# Patient Record
Sex: MALE | Race: WHITE | NOT HISPANIC OR LATINO | Employment: OTHER | URBAN - METROPOLITAN AREA
[De-identification: names, ages, dates, MRNs, and addresses within clinical notes are randomized per-mention and may not be internally consistent; named-entity substitution may affect disease eponyms.]

---

## 2017-01-19 ENCOUNTER — OFFICE VISIT (OUTPATIENT)
Dept: PULMONOLOGY | Facility: CLINIC | Age: 82
End: 2017-01-19
Payer: MEDICARE

## 2017-01-19 ENCOUNTER — HOSPITAL ENCOUNTER (OUTPATIENT)
Dept: PULMONOLOGY | Facility: CLINIC | Age: 82
Discharge: HOME | End: 2017-01-19
Payer: MEDICARE

## 2017-01-19 VITALS
DIASTOLIC BLOOD PRESSURE: 74 MMHG | RESPIRATION RATE: 14 BRPM | SYSTOLIC BLOOD PRESSURE: 122 MMHG | WEIGHT: 198 LBS | BODY MASS INDEX: 28.35 KG/M2 | HEIGHT: 70 IN | HEART RATE: 73 BPM | OXYGEN SATURATION: 96 %

## 2017-01-19 DIAGNOSIS — J43.8 OTHER EMPHYSEMA: ICD-10-CM

## 2017-01-19 DIAGNOSIS — C67.9 MALIGNANT NEOPLASM OF URINARY BLADDER, UNSPECIFIED SITE: ICD-10-CM

## 2017-01-19 DIAGNOSIS — I25.810 CORONARY ARTERY DISEASE INVOLVING CORONARY BYPASS GRAFT OF NATIVE HEART WITHOUT ANGINA PECTORIS: ICD-10-CM

## 2017-01-19 DIAGNOSIS — J43.2 CENTRILOBULAR EMPHYSEMA: Primary | ICD-10-CM

## 2017-01-19 LAB
PRE FEV1 FVC: 65
PRE FEV1: 2.51
PRE FVC: 3.84
PREDICTED FEV1 FVC: 78
PREDICTED FEV1: 2.85
PREDICTED FVC: 3.64

## 2017-01-19 PROCEDURE — 99213 OFFICE O/P EST LOW 20 MIN: CPT | Mod: PBBFAC | Performed by: INTERNAL MEDICINE

## 2017-01-19 PROCEDURE — 99214 OFFICE O/P EST MOD 30 MIN: CPT | Mod: 25,S$PBB,, | Performed by: INTERNAL MEDICINE

## 2017-01-19 PROCEDURE — 99999 PR PBB SHADOW E&M-EST. PATIENT-LVL III: CPT | Mod: PBBFAC,,, | Performed by: INTERNAL MEDICINE

## 2017-01-19 PROCEDURE — 94010 BREATHING CAPACITY TEST: CPT | Mod: 26,S$PBB,, | Performed by: INTERNAL MEDICINE

## 2017-01-19 PROCEDURE — 94729 DIFFUSING CAPACITY: CPT | Mod: 26,S$PBB,, | Performed by: INTERNAL MEDICINE

## 2017-01-19 RX ORDER — LEVALBUTEROL TARTRATE 45 UG/1
2 AEROSOL, METERED ORAL EVERY 6 HOURS PRN
Qty: 1 INHALER | Refills: 4 | Status: SHIPPED | OUTPATIENT
Start: 2017-01-19 | End: 2018-08-09 | Stop reason: SDUPTHER

## 2017-01-19 NOTE — PROGRESS NOTES
Subjective:       Patient ID: Mayur Roblero is a 81 y.o. male.    Chief Complaint: COPD    HPI HISTORY OF PRESENT ILLNESS:  Mr. Roblero is a 81-year-old former smoker, who   returns for an interval assessment of chronic obstructive lung disease.  He   feels that he has had a stable respiratory status since his visit here in March of   last year.  He has continued to use Pulmicort on a twice daily schedule as   planned.  He also has Xopenex HFA inhaler, but has not needed to make recent use   of this.  He also has not had to take antibiotics or prednisone for any recent   exacerbations of respiratory symptoms.  He does continue to have dyspnea with   moderate exertion.  He has not had any exertional or pleuritic chest pain.    DATA:  I have reviewed the results from pulmonary function studies done earlier   today.  The forced vital capacity is 3.84 L, which is 106% of predicted.  The   FEV1 is 2.51 L, or 89% of predicted.  The ratio of these values is 65%.  The   diffusion capacity is 13.9, which is 60% of predicted.  When today's study is   compared to the previous study from September 2015, there does not appear to   have been any detrimental interval change.      CB/HN  dd: 01/19/2017 20:19:26 (CST)  td: 01/20/2017 07:29:43 (CST)  Doc ID   #[id number]  Job ID #[id number]    CC:       Review of Systems   Constitutional: Negative for fever and fatigue.   HENT: Negative for postnasal drip, sinus pressure, voice change and congestion.    Respiratory: Positive for cough and dyspnea on extertion. Negative for sputum production, shortness of breath and wheezing.    Cardiovascular: Negative for chest pain and leg swelling.   Genitourinary: Negative for difficulty urinating.   Musculoskeletal: Positive for arthralgias. Negative for back pain.   Skin: Negative for rash.   Gastrointestinal: Negative for abdominal pain and acid reflux.   Neurological: Negative for dizziness and weakness.   Hematological: Negative for adenopathy.        Objective:      Physical Exam   Constitutional: He is oriented to person, place, and time. He appears well-developed and well-nourished.   HENT:   Head: Normocephalic.   Mouth/Throat: Oropharynx is clear and moist. No oropharyngeal exudate.   Neck: Normal range of motion. Neck supple. No JVD present. No tracheal deviation present. No thyromegaly present.   Cardiovascular: Normal rate, regular rhythm and normal heart sounds.    No murmur heard.  Pulmonary/Chest: Symmetric chest wall expansion. No stridor. He has decreased breath sounds. He has no wheezes. He has no rhonchi. He has no rales. He exhibits no tenderness.   Abdominal: Soft.   Musculoskeletal: He exhibits no edema.   Lymphadenopathy:     He has no cervical adenopathy.   Neurological: He is alert and oriented to person, place, and time.   Skin: Skin is warm and dry. No rash noted. No erythema. Nails show no clubbing.   Psychiatric: He has a normal mood and affect.   Vitals reviewed.    Personal Diagnostic Review    No flowsheet data found.      Assessment:       1. Centrilobular emphysema    2. Malignant neoplasm of urinary bladder, unspecified site    3. Coronary artery disease involving coronary bypass graft of native heart without angina pectoris        Outpatient Encounter Prescriptions as of 1/19/2017   Medication Sig Dispense Refill    ALPHAGAN P 0.1 % Drop   0    amlodipine (NORVASC) 5 MG tablet TAKE 1 TABLET BY MOUTH EVERY DAY 30 tablet 6    aripiprazole (ABILIFY) 2 MG Tab TAKE 2 TABLETS BY MOUTH EVERY  tablet 2    aspirin (ECOTRIN) 81 MG EC tablet Take 81 mg by mouth once daily.        budesonide 180mcg (PULMICORT FLEXHALER) 180 mcg/actuation AePB Inhale 2 puffs into the lungs 2 (two) times daily. 1 each 5    CRESTOR 10 mg tablet TAKE 1 TABLET BY MOUTH EVERY DAY 30 tablet 6    CRESTOR 10 mg tablet TAKE 1 TABLET BY MOUTH EVERY DAY 30 tablet 6    doxycycline (VIBRA-TABS) 100 MG tablet Take 1 tablet (100 mg total) by mouth every 12  (twelve) hours. 20 tablet 1    levalbuterol (XOPENEX HFA) 45 mcg/actuation inhaler Inhale 2 puffs into the lungs every 6 (six) hours as needed for Wheezing or Shortness of Breath. 1 Inhaler 4    meclizine (ANTIVERT) 25 mg tablet Take 25 mg by mouth 3 (three) times daily as needed.      OMEGA-3S/DHA/EPA/FISH OIL (OMEGA 3 ORAL) Take by mouth once daily.      prednisoLONE acetate (PRED FORTE) 1 % DrpS   0    predniSONE (DELTASONE) 10 MG tablet Take 3 tabs per day x 5 days, then 2 tabs per day x 5 days, then 1 tab per day x 5 days, then stop. Take with food 30 tablet 1    rosuvastatin (CRESTOR) 10 MG tablet Take 1 tablet (10 mg total) by mouth once daily. 30 tablet 11    valacyclovir (VALTREX) 500 MG tablet Take 500 mg by mouth once daily.      vit C,E,Zn,Cu glu-om3-lut-clau (OCUVITE ADULT 50+) 250-5-1 mg Cap Take by mouth once daily.      [DISCONTINUED] levalbuterol (XOPENEX HFA) 45 mcg/actuation inhaler Inhale 2 puffs into the lungs every 6 (six) hours as needed for Wheezing or Shortness of Breath. 1 Inhaler 4    [DISCONTINUED] PULMICORT FLEXHALER 180 mcg/actuation AePB TAKE 2 PUFFS TWICE A DAY AS DIRECTED 1 each 5    [DISCONTINUED] PULMICORT FLEXHALER 180 mcg/actuation AePB TAKE 2 PUFFS TWICE A DAY AS DIRECTED 1 each 5     No facility-administered encounter medications on file as of 1/19/2017.      No orders of the defined types were placed in this encounter.    Plan:     Continue present respiratory medications (roles reviewed at today's visit).  Refills ordered for Pulmicort and Xopenex HFA.  Discussed the early role for antibiotics for treatment of respiratory infection.  Return visit 6 months with CXR before.

## 2017-01-20 NOTE — PATIENT INSTRUCTIONS
Continue present respiratory medications (roles reviewed at today's visit).  Refills ordered for Pulmicort and Xopenex HFA.  Discussed the early role for antibiotics for treatment of respiratory infection.  Return visit 6 months with CXR before.

## 2017-03-29 ENCOUNTER — PATIENT MESSAGE (OUTPATIENT)
Dept: RESEARCH | Facility: HOSPITAL | Age: 82
End: 2017-03-29

## 2017-04-13 RX ORDER — BUDESONIDE 180 UG/1
AEROSOL, POWDER RESPIRATORY (INHALATION)
Qty: 1 EACH | Refills: 12 | Status: SHIPPED | OUTPATIENT
Start: 2017-04-13 | End: 2018-04-19 | Stop reason: SDUPTHER

## 2017-06-30 ENCOUNTER — TELEPHONE (OUTPATIENT)
Dept: NEUROLOGY | Facility: CLINIC | Age: 82
End: 2017-06-30

## 2017-06-30 NOTE — TELEPHONE ENCOUNTER
----- Message from Leida Blount sent at 6/30/2017 11:16 AM CDT -----  Contact: Alissa wife [phone]  Wife is calling to schedule an EP appt for her , patient has shingles. Please call

## 2017-08-02 ENCOUNTER — OFFICE VISIT (OUTPATIENT)
Dept: NEUROLOGY | Facility: CLINIC | Age: 82
End: 2017-08-02
Payer: MEDICARE

## 2017-08-02 ENCOUNTER — HOSPITAL ENCOUNTER (OUTPATIENT)
Dept: RADIOLOGY | Facility: HOSPITAL | Age: 82
Discharge: HOME | End: 2017-08-02
Attending: INTERNAL MEDICINE
Payer: MEDICARE

## 2017-08-02 ENCOUNTER — OFFICE VISIT (OUTPATIENT)
Dept: PULMONOLOGY | Facility: CLINIC | Age: 82
End: 2017-08-02
Payer: MEDICARE

## 2017-08-02 ENCOUNTER — PATIENT MESSAGE (OUTPATIENT)
Dept: NEUROLOGY | Facility: CLINIC | Age: 82
End: 2017-08-02

## 2017-08-02 VITALS
WEIGHT: 195.13 LBS | BODY MASS INDEX: 27.94 KG/M2 | HEIGHT: 70 IN | SYSTOLIC BLOOD PRESSURE: 140 MMHG | HEART RATE: 69 BPM | DIASTOLIC BLOOD PRESSURE: 66 MMHG | OXYGEN SATURATION: 95 % | RESPIRATION RATE: 14 BRPM

## 2017-08-02 VITALS
WEIGHT: 195.56 LBS | BODY MASS INDEX: 28 KG/M2 | HEIGHT: 70 IN | SYSTOLIC BLOOD PRESSURE: 111 MMHG | DIASTOLIC BLOOD PRESSURE: 72 MMHG | HEART RATE: 90 BPM

## 2017-08-02 DIAGNOSIS — G25.69 TICS OF ORGANIC ORIGIN: ICD-10-CM

## 2017-08-02 DIAGNOSIS — I25.810 CORONARY ARTERY DISEASE INVOLVING CORONARY BYPASS GRAFT OF NATIVE HEART WITHOUT ANGINA PECTORIS: ICD-10-CM

## 2017-08-02 DIAGNOSIS — B02.8 HERPES ZOSTER WITH COMPLICATION: Primary | ICD-10-CM

## 2017-08-02 DIAGNOSIS — J43.2 CENTRILOBULAR EMPHYSEMA: ICD-10-CM

## 2017-08-02 DIAGNOSIS — B02.22 POST-HERPETIC TRIGEMINAL NEURALGIA: ICD-10-CM

## 2017-08-02 DIAGNOSIS — C67.9 MALIGNANT NEOPLASM OF URINARY BLADDER, UNSPECIFIED SITE: Primary | ICD-10-CM

## 2017-08-02 DIAGNOSIS — J84.10 POSTINFLAMMATORY PULMONARY FIBROSIS: ICD-10-CM

## 2017-08-02 PROCEDURE — 99213 OFFICE O/P EST LOW 20 MIN: CPT | Mod: PBBFAC,25,27 | Performed by: INTERNAL MEDICINE

## 2017-08-02 PROCEDURE — 99214 OFFICE O/P EST MOD 30 MIN: CPT | Mod: S$PBB,,, | Performed by: INTERNAL MEDICINE

## 2017-08-02 PROCEDURE — 1125F AMNT PAIN NOTED PAIN PRSNT: CPT | Mod: ,,, | Performed by: PSYCHIATRY & NEUROLOGY

## 2017-08-02 PROCEDURE — 99215 OFFICE O/P EST HI 40 MIN: CPT | Mod: S$PBB,,, | Performed by: PSYCHIATRY & NEUROLOGY

## 2017-08-02 PROCEDURE — 99999 PR PBB SHADOW E&M-EST. PATIENT-LVL III: CPT | Mod: PBBFAC,,, | Performed by: PSYCHIATRY & NEUROLOGY

## 2017-08-02 PROCEDURE — 71020 XR CHEST PA AND LATERAL: CPT | Mod: 26,,, | Performed by: RADIOLOGY

## 2017-08-02 PROCEDURE — 1159F MED LIST DOCD IN RCRD: CPT | Mod: ,,, | Performed by: INTERNAL MEDICINE

## 2017-08-02 PROCEDURE — 1159F MED LIST DOCD IN RCRD: CPT | Mod: ,,, | Performed by: PSYCHIATRY & NEUROLOGY

## 2017-08-02 PROCEDURE — 1126F AMNT PAIN NOTED NONE PRSNT: CPT | Mod: ,,, | Performed by: INTERNAL MEDICINE

## 2017-08-02 PROCEDURE — 99999 PR PBB SHADOW E&M-EST. PATIENT-LVL III: CPT | Mod: PBBFAC,,, | Performed by: INTERNAL MEDICINE

## 2017-08-02 RX ORDER — CLOPIDOGREL BISULFATE 75 MG/1
75 TABLET ORAL DAILY
Status: ON HOLD | COMMUNITY
End: 2019-02-15 | Stop reason: SDUPTHER

## 2017-08-02 RX ORDER — DULOXETIN HYDROCHLORIDE 30 MG/1
30 CAPSULE, DELAYED RELEASE ORAL DAILY
Qty: 30 CAPSULE | Refills: 11 | Status: ON HOLD | OUTPATIENT
Start: 2017-08-02 | End: 2019-05-18 | Stop reason: CLARIF

## 2017-08-02 NOTE — PROGRESS NOTES
Name: Mayur Roblero  MRN: [id number]   CSN: [id number]      Date: 08/02/2017    Chief Complaint:    - recent shingles of the L face, causing pain since K[place] 10.  Still itching.  Has cried from it.  - entire left face, treated with antivirals  - still with pain, treated with gabapentin 600 and made nervous and dizzy.  Has not tried anything else.  - tics and extra movements about the same, wife happy about that!    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  Tics are the greatest issue now.  Choreic movements are much less, but throat clearing tic is more dramatic.  Bothers his wife.  She wonders if it is a habit.    Sex drive and ED is new.  [employer] works.    ROS:  Review of Systems   Constitutional: Negative for malaise/fatigue and weight loss.   HENT: Negative for hearing loss.    Eyes: Negative for blurred vision and double vision.   Respiratory: Negative for shortness of breath and stridor.    Cardiovascular: Negative for chest pain and palpitations.   Gastrointestinal: Negative for constipation, nausea and vomiting.   Genitourinary: Negative for frequency and urgency.   Musculoskeletal: Negative for falls and myalgias.   Skin: Negative for rash.   Neurological: Negative for focal weakness and seizures.   Endo/Heme/Allergies: Does not bruise/bleed easily.   Psychiatric/Behavioral: Positive for memory loss (Seems to be pretty stable over a year). Negative for depression and hallucinations. The patient is nervous/anxious.        Past Medical History: The patient  has a past medical history of Bladder cancer; Coronary artery disease; Emphysema of lung; Hyperlipidemia; Pneumonia; and Pulmonary nodule.    Social History: The patient  reports that he quit smoking about 9 years ago. His smoking use included Cigarettes and Pipe. He has a 45.00 pack-year smoking history. He has never used smokeless tobacco. He reports that he does not drink alcohol or  use drugs.    Family History: Their family history includes Heart attack in his brother and mother; Heart disease in his brother and mother; Hypertension in his father.    Allergies: Avelox [moxifloxacin]; Dilaudid [hydromorphone]; Keflex [cephalexin]; Morphine; Spiriva with handihaler [tiotropium bromide]; Codeine; and Ondansetron     Meds:   Current Outpatient Prescriptions on File Prior to Visit   Medication Sig Dispense Refill    amlodipine (NORVASC) 5 MG tablet TAKE 1 TABLET BY MOUTH EVERY DAY 30 tablet 6    aripiprazole (ABILIFY) 2 MG Tab TAKE 2 TABLETS BY MOUTH EVERY  tablet 2    aspirin (ECOTRIN) 81 MG EC tablet Take 81 mg by mouth once daily.        budesonide 180mcg (PULMICORT FLEXHALER) 180 mcg/actuation AePB Inhale 2 puffs into the lungs 2 (two) times daily. 1 each 5    levalbuterol (XOPENEX HFA) 45 mcg/actuation inhaler Inhale 2 puffs into the lungs every 6 (six) hours as needed for Wheezing or Shortness of Breath. 1 Inhaler 4    OMEGA-3S/DHA/EPA/FISH OIL (OMEGA 3 ORAL) Take by mouth once daily.      prednisoLONE acetate (PRED FORTE) 1 % DrpS   0    PULMICORT FLEXHALER 180 mcg/actuation AePB USE 2 PUFFS 2 TIMES DAILY AS DIRECTED 1 each 12    rosuvastatin (CRESTOR) 10 MG tablet Take 1 tablet (10 mg total) by mouth once daily. 30 tablet 11    vit C,E,Zn,Cu glu-om3-lut-clau (OCUVITE ADULT 50+) 250-5-1 mg Cap Take by mouth once daily.      ALPHAGAN P 0.1 % Drop   0    CRESTOR 10 mg tablet TAKE 1 TABLET BY MOUTH EVERY DAY 30 tablet 6    CRESTOR 10 mg tablet TAKE 1 TABLET BY MOUTH EVERY DAY 30 tablet 6    doxycycline (VIBRA-TABS) 100 MG tablet Take 1 tablet (100 mg total) by mouth every 12 (twelve) hours. 20 tablet 1    meclizine (ANTIVERT) 25 mg tablet Take 25 mg by mouth 3 (three) times daily as needed.      predniSONE (DELTASONE) 10 MG tablet Take 3 tabs per day x 5 days, then 2 tabs per day x 5 days, then 1 tab per day x 5 days, then stop. Take with food 30 tablet 1    valacyclovir  "(VALTREX) 500 MG tablet Take 500 mg by mouth once daily.       No current facility-administered medications on file prior to visit.        Exam:  /72   Pulse 90   Ht 5' 10" (1.778 m)   Wt 88.7 kg (195 lb 8.8 oz)   BMI 28.06 kg/m²     Constitutional  Well-developed, well-nourished, appears stated age  Crusted lesions ov V1-V3 on the L.   * Specialized movement exam  MIld gravelly speech.   No facial masking.   Mild loss of wrinkling upper face VII.  Normal tone throughout.   No bradykinesia.   No tremor with rest, posture, kinesis, or intention.     No other dystonia, chorea, athetosis, myoclonus, or tics. No motor impersistence.   Gait is normal-based and steady with good stride length and normal arm swing. Able to tandem walk. No postural instability.      Laboratory/Radiological:  - Results:  No visits with results within 3 Month(s) from this visit.   Latest known visit with results is:   Admission on 04/21/2015, Discharged on 04/21/2015   Component Date Value Ref Range Status    WBC 04/21/2015 6.85  3.90 - 12.70 K/uL Final    RBC 04/21/2015 4.26* 4.60 - 6.20 M/uL Final    Hemoglobin 04/21/2015 14.2  14.0 - 18.0 g/dL Final    Hematocrit 04/21/2015 40.9  40.0 - 54.0 % Final    MCV 04/21/2015 96  82 - 98 fL Final    MCH 04/21/2015 33.3* 27.0 - 31.0 pg Final    MCHC 04/21/2015 34.7  32.0 - 36.0 % Final    RDW 04/21/2015 13.6  11.5 - 14.5 % Final    Platelets 04/21/2015 198  150 - 350 K/uL Final    MPV 04/21/2015 11.0  9.2 - 12.9 fL Final    Gran # 04/21/2015 4.6  1.8 - 7.7 K/uL Final    Lymph # 04/21/2015 1.1  1.0 - 4.8 K/uL Final    Mono # 04/21/2015 0.8  0.3 - 1.0 K/uL Final    Eos # 04/21/2015 0.3  0.0 - 0.5 K/uL Final    Baso # 04/21/2015 0.01  0.00 - 0.20 K/uL Final    Gran% 04/21/2015 67.5  38.0 - 73.0 % Final    Lymph% 04/21/2015 16.1* 18.0 - 48.0 % Final    Mono% 04/21/2015 12.0  4.0 - 15.0 % Final    Eosinophil% 04/21/2015 3.6  0.0 - 8.0 % Final    Basophil% 04/21/2015 0.1  0.0 " - 1.9 % Final    Differential Method 04/21/2015 Automated   Final    Sodium 04/21/2015 138  136 - 145 mmol/L Final    Potassium 04/21/2015 4.1  3.5 - 5.1 mmol/L Final    Chloride 04/21/2015 106  95 - 110 mmol/L Final    CO2 04/21/2015 22* 23 - 29 mmol/L Final    Glucose 04/21/2015 119* 70 - 110 mg/dL Final    BUN, Bld 04/21/2015 21  8 - 23 mg/dL Final    Creatinine 04/21/2015 1.2  0.5 - 1.4 mg/dL Final    Calcium 04/21/2015 9.8  8.7 - 10.5 mg/dL Final    Total Protein 04/21/2015 7.4  6.0 - 8.4 g/dL Final    Albumin 04/21/2015 3.6  3.5 - 5.2 g/dL Final    Total Bilirubin 04/21/2015 0.7  0.1 - 1.0 mg/dL Final    Alkaline Phosphatase 04/21/2015 91  55 - 135 U/L Final    AST 04/21/2015 32  10 - 40 U/L Final    ALT 04/21/2015 28  10 - 44 U/L Final    Anion Gap 04/21/2015 10  8 - 16 mmol/L Final    eGFR if African American 04/21/2015 >60.0  >60 mL/min/1.73 m^2 Final    eGFR if non African American 04/21/2015 57.6* >60 mL/min/1.73 m^2 Final    TSH 04/21/2015 1.496  0.400 - 4.000 uIU/mL Final    Specimen UA 04/21/2015 Urine, Clean Catch   Final    Color, UA 04/21/2015 Straw  Yellow, Straw, Suzette Final    Appearance, UA 04/21/2015 Clear  Clear Final    pH, UA 04/21/2015 7.0  5.0 - 8.0 Final    Specific Gravity, UA 04/21/2015 1.000* 1.005 - 1.030 Final    Protein, UA 04/21/2015 Negative  Negative Final    Glucose, UA 04/21/2015 Negative  Negative Final    Ketones, UA 04/21/2015 Negative  Negative Final    Bilirubin (UA) 04/21/2015 Negative  Negative Final    Occult Blood UA 04/21/2015 Negative  Negative Final    Nitrite, UA 04/21/2015 Negative  Negative Final    Urobilinogen, UA 04/21/2015 Negative  <2.0 EU/dL Final    Leukocytes, UA 04/21/2015 Negative  Negative Final    Magnesium 04/21/2015 2.2  1.6 - 2.6 mg/dL Final    Phosphorus 04/21/2015 2.7  2.7 - 4.5 mg/dL Final     - Independent review of images:     Diagnoses:          1) Chorea and tics, secondary to anoxic injury.  LOOKS GREAT.    2) Zoster, L face.  Now with post-herpetic neuroalgia.      Medical Decision Makin) Continue Abilify as 1-2 tabs at night  2) Did not tolerate high dose Gabapentin.  Could go lower.  Will trial Cymbalta 30 mg daily now.      Lobito Horowitz MD, MPH  Division of Movement and Memory Disorders  [facility]  [phone]

## 2017-08-02 NOTE — PROGRESS NOTES
Subjective:       Patient ID: Mayur Roblero is a 82 y.o. male.    Chief Complaint: COPD    HPI Mr. Roblero is an 82-year-old former smoker who comes for an interval assessment   of chronic obstructive lung disease.  His most recent previous visit here was in   January.  Since that time, he has had an additional coronary artery stent   placement by his cardiologist at home.  Then, approximately six weeks ago, he   developed shingles, which involved left side of his face.  He has had a very   slow recovery from this event.    Mr. Roblero has not had any recent exacerbations of respiratory symptoms.  He   continues to use Pulmicort on a regular schedule.  He has Xopenex, but has not   felt the need to use this in recent months.  He has not had to take antibiotics   for respiratory symptoms during these past six months or longer.    Mr. Roblero notes some cough in the evening with the feeling of throat congestion.    He is usually not able to expectorate any sputum.  He has not taken any   expectorants.    Mr. Roblero has the history of a previous bladder cancer.  He has not had any   recurrence of this malignancy during the past several years.    DATA:  I reviewed the images from a chest x-ray done earlier today.  The cardiac   silhouette is not enlarged.  There are mild linear streaks in the upper lung   zone on the right.  This is consistent with localized scarring related to his   previous pneumonia.  Findings in this area appear stable when today's radiograph   is compared to previous films from March of last year and February 2015.      CB/HN  dd: 08/02/2017 20:47:18 (CDT)  td: 08/03/2017 04:40:14 (CDT)  Doc ID   #[id number]  Job ID #[id number]    CC:       Review of Systems   Constitutional: Negative for fever and fatigue.   HENT: Negative for postnasal drip, sinus pressure, voice change and congestion.    Respiratory: Positive for dyspnea on extertion. Negative for cough, sputum production, shortness of breath and  wheezing.    Cardiovascular: Negative for chest pain and leg swelling.   Genitourinary: Negative for difficulty urinating.   Musculoskeletal: Negative for arthralgias and back pain.   Skin: Positive for rash (recent Shingles L side face).   Gastrointestinal: Negative for abdominal pain and acid reflux.   Neurological: Negative for dizziness and weakness.   Hematological: Negative for adenopathy.       Objective:      Physical Exam   Constitutional: He is oriented to person, place, and time. He appears well-developed and well-nourished.   HENT:   Head: Normocephalic.   Mouth/Throat: Oropharynx is clear and moist. No oropharyngeal exudate.   Neck: Normal range of motion. Neck supple. No JVD present. No tracheal deviation present. No thyromegaly present.   Cardiovascular: Normal rate, regular rhythm and normal heart sounds.    No murmur heard.  Pulmonary/Chest: Symmetric chest wall expansion. No stridor. He has decreased breath sounds. He has no wheezes. He has no rhonchi. He has no rales. He exhibits no tenderness.   Abdominal: Soft.   Musculoskeletal: He exhibits no edema.   Lymphadenopathy:     He has no cervical adenopathy.   Neurological: He is alert and oriented to person, place, and time.   Skin: Skin is warm and dry. No rash noted. No erythema. Nails show no clubbing.   Psychiatric: He has a normal mood and affect.   Vitals reviewed.    Personal Diagnostic Review    No flowsheet data found.      Assessment:       1. Malignant neoplasm of urinary bladder, unspecified site    2. Coronary artery disease involving coronary bypass graft of native heart without angina pectoris    3. Centrilobular emphysema    4. Postinflammatory pulmonary fibrosis        Outpatient Encounter Prescriptions as of 8/2/2017   Medication Sig Dispense Refill    ALPHAGAN P 0.1 % Drop   0    amlodipine (NORVASC) 5 MG tablet TAKE 1 TABLET BY MOUTH EVERY DAY 30 tablet 6    aripiprazole (ABILIFY) 2 MG Tab TAKE 2 TABLETS BY MOUTH EVERY DAY  180 tablet 2    aspirin (ECOTRIN) 81 MG EC tablet Take 81 mg by mouth once daily.        budesonide 180mcg (PULMICORT FLEXHALER) 180 mcg/actuation AePB Inhale 2 puffs into the lungs 2 (two) times daily. 1 each 5    clopidogrel (PLAVIX) 75 mg tablet Take 75 mg by mouth once daily.      CRESTOR 10 mg tablet TAKE 1 TABLET BY MOUTH EVERY DAY 30 tablet 6    CRESTOR 10 mg tablet TAKE 1 TABLET BY MOUTH EVERY DAY 30 tablet 6    doxycycline (VIBRA-TABS) 100 MG tablet Take 1 tablet (100 mg total) by mouth every 12 (twelve) hours. 20 tablet 1    duloxetine (CYMBALTA) 30 MG capsule Take 1 capsule (30 mg total) by mouth once daily. 30 capsule 11    levalbuterol (XOPENEX HFA) 45 mcg/actuation inhaler Inhale 2 puffs into the lungs every 6 (six) hours as needed for Wheezing or Shortness of Breath. 1 Inhaler 4    meclizine (ANTIVERT) 25 mg tablet Take 25 mg by mouth 3 (three) times daily as needed.      OMEGA-3S/DHA/EPA/FISH OIL (OMEGA 3 ORAL) Take by mouth once daily.      prednisoLONE acetate (PRED FORTE) 1 % DrpS   0    predniSONE (DELTASONE) 10 MG tablet Take 3 tabs per day x 5 days, then 2 tabs per day x 5 days, then 1 tab per day x 5 days, then stop. Take with food 30 tablet 1    PULMICORT FLEXHALER 180 mcg/actuation AePB USE 2 PUFFS 2 TIMES DAILY AS DIRECTED 1 each 12    rosuvastatin (CRESTOR) 10 MG tablet Take 1 tablet (10 mg total) by mouth once daily. 30 tablet 11    valacyclovir (VALTREX) 500 MG tablet Take 500 mg by mouth once daily.      vit C,E,Zn,Cu glu-om3-lut-clau (OCUVITE ADULT 50+) 250-5-1 mg Cap Take by mouth once daily.       No facility-administered encounter medications on file as of 8/2/2017.      Orders Placed This Encounter   Procedures    Spirometry without Bronchodilator     Standing Status:   Future     Standing Expiration Date:   4/3/2018    DLCO-Carbon Monoxide Diffusing Capacity     Standing Status:   Future     Standing Expiration Date:   4/3/2018     Plan:     Continue present  respiratory medications (roles reviewed at today's visit).  Discussed the early role for antibiotics for treatment of respiratory infection.  Return visit 6 months with Spirometry and DLCO.

## 2017-08-02 NOTE — PATIENT INSTRUCTIONS
Continue present respiratory medications (roles reviewed at today's visit).  Discussed the early role for antibiotics for treatment of respiratory infection.  Return visit 6 months with Spirometry and DLCO.

## 2017-08-03 RX ORDER — ARIPIPRAZOLE 2 MG/1
4 TABLET ORAL DAILY
Qty: 180 TABLET | Refills: 3 | Status: SHIPPED | OUTPATIENT
Start: 2017-08-03 | End: 2018-10-26 | Stop reason: SDUPTHER

## 2017-08-14 ENCOUNTER — PATIENT MESSAGE (OUTPATIENT)
Dept: NEUROLOGY | Facility: CLINIC | Age: 82
End: 2017-08-14

## 2018-03-21 ENCOUNTER — HOSPITAL ENCOUNTER (OUTPATIENT)
Dept: PULMONOLOGY | Facility: CLINIC | Age: 83
Discharge: HOME | End: 2018-03-21
Payer: MEDICARE

## 2018-03-21 ENCOUNTER — OFFICE VISIT (OUTPATIENT)
Dept: PULMONOLOGY | Facility: CLINIC | Age: 83
End: 2018-03-21
Payer: MEDICARE

## 2018-03-21 VITALS
RESPIRATION RATE: 14 BRPM | HEART RATE: 69 BPM | WEIGHT: 198 LBS | HEIGHT: 69 IN | DIASTOLIC BLOOD PRESSURE: 78 MMHG | BODY MASS INDEX: 29.33 KG/M2 | OXYGEN SATURATION: 96 % | SYSTOLIC BLOOD PRESSURE: 118 MMHG

## 2018-03-21 DIAGNOSIS — C67.9 MALIGNANT NEOPLASM OF URINARY BLADDER, UNSPECIFIED SITE: ICD-10-CM

## 2018-03-21 DIAGNOSIS — J43.2 CENTRILOBULAR EMPHYSEMA: ICD-10-CM

## 2018-03-21 DIAGNOSIS — J43.2 CENTRILOBULAR EMPHYSEMA: Primary | ICD-10-CM

## 2018-03-21 DIAGNOSIS — I25.810 CORONARY ARTERY DISEASE INVOLVING CORONARY BYPASS GRAFT OF NATIVE HEART WITHOUT ANGINA PECTORIS: ICD-10-CM

## 2018-03-21 DIAGNOSIS — J84.10 POSTINFLAMMATORY PULMONARY FIBROSIS: ICD-10-CM

## 2018-03-21 DIAGNOSIS — R06.02 SHORTNESS OF BREATH: ICD-10-CM

## 2018-03-21 LAB
PRE FEV1 FVC: 67
PRE FEV1: 2.55
PRE FVC: 3.8
PREDICTED FEV1 FVC: 77
PREDICTED FEV1: 2.79
PREDICTED FVC: 3.58

## 2018-03-21 PROCEDURE — 99214 OFFICE O/P EST MOD 30 MIN: CPT | Mod: 25,S$PBB,, | Performed by: INTERNAL MEDICINE

## 2018-03-21 PROCEDURE — 99999 PR PBB SHADOW E&M-EST. PATIENT-LVL III: CPT | Mod: PBBFAC,,, | Performed by: INTERNAL MEDICINE

## 2018-03-21 PROCEDURE — 94010 BREATHING CAPACITY TEST: CPT | Mod: PBBFAC | Performed by: INTERNAL MEDICINE

## 2018-03-21 PROCEDURE — 99213 OFFICE O/P EST LOW 20 MIN: CPT | Mod: PBBFAC | Performed by: INTERNAL MEDICINE

## 2018-03-21 PROCEDURE — 94010 BREATHING CAPACITY TEST: CPT | Mod: 26,S$PBB,, | Performed by: INTERNAL MEDICINE

## 2018-03-21 NOTE — PATIENT INSTRUCTIONS
Continue present respiratory medications (roles reviewed at today's visit).  Discussed the early role for antibiotics for treatment of respiratory infection.  Return visit 6 months with CXR before.

## 2018-03-21 NOTE — PROGRESS NOTES
Subjective:       Patient ID: Mayur Roblero is a 83 y.o. male.    Chief Complaint: COPD    HPI HISTORY OF PRESENT ILLNESS:  Mr. Roblero is an 83-year-old former smoker, who   returns for an interval assessment of chronic obstructive lung disease.  His   most recent previous visit here was in August of last year.  He has not had any   recent exacerbations of respiratory symptoms.  He remains fairly active.  He   does describe shortness of breath with moderate exertion.  He has not had any   ongoing nighttime respiratory problems.    Mr. Roblero continues to use Pulmicort Flexhaler.  He also has a Xopenex inhaler,   but makes very frequent use of this.  He has not had to take antibiotics or   oral corticosteroids for respiratory symptoms during the past year.    Mr. Roblero is monitored by physician near his home for treatment of coronary   artery disease and previous bladder cancer.    DATA:  I reviewed the results of a spirometry study done earlier today.  The   forced vital capacity is 3.80 L, which is 106% of the expected value.  The FEV1   is 2.25 L or 93% of predicted.  The ratio of these values is 67%.  When today's   spirometry results are compared to previous studies from January of last year   and September 2015, there does not appear to have been any detrimental change.      CB/HN  dd: 03/21/2018 19:53:49 (CDT)  td: 03/22/2018 15:40:59 (CDT)  Doc ID   #[id number]  Job ID #[id number]    CC:       Review of Systems   Constitutional: Negative for fever and fatigue.   HENT: Negative for postnasal drip, sinus pressure, voice change and congestion.    Respiratory: Positive for dyspnea on extertion. Negative for cough, sputum production, shortness of breath and wheezing.    Cardiovascular: Negative for chest pain and leg swelling.   Genitourinary: Negative for difficulty urinating.   Musculoskeletal: Positive for arthralgias. Negative for back pain.   Skin: Negative for rash.   Gastrointestinal: Negative for abdominal pain  and acid reflux.   Neurological: Negative for dizziness and weakness.   Hematological: Negative for adenopathy.       Objective:      Physical Exam   Constitutional: He is oriented to person, place, and time. He appears well-developed and well-nourished.   HENT:   Head: Normocephalic.   Mouth/Throat: Oropharynx is clear and moist. No oropharyngeal exudate.   Neck: Normal range of motion. Neck supple. No JVD present. No tracheal deviation present. No thyromegaly present.   Cardiovascular: Normal rate, regular rhythm and normal heart sounds.    No murmur heard.  Pulmonary/Chest: Hyperinflation. No stridor. He has no wheezes. He has no rhonchi. He has no rales. He exhibits no tenderness.   Abdominal: Soft.   Musculoskeletal: He exhibits no edema.   Lymphadenopathy:     He has no cervical adenopathy.   Neurological: He is alert and oriented to person, place, and time.   Skin: Skin is warm and dry. No rash noted. No erythema. Nails show no clubbing.   Psychiatric: He has a normal mood and affect.   Vitals reviewed.    Personal Diagnostic Review    No flowsheet data found.      Assessment:       1. Centrilobular emphysema    2. Postinflammatory pulmonary fibrosis    3. Coronary artery disease involving coronary bypass graft of native heart without angina pectoris    4. Malignant neoplasm of urinary bladder, unspecified site    5. Shortness of breath        Outpatient Encounter Prescriptions as of 3/21/2018   Medication Sig Dispense Refill    ALPHAGAN P 0.1 % Drop   0    amlodipine (NORVASC) 5 MG tablet TAKE 1 TABLET BY MOUTH EVERY DAY 30 tablet 6    aripiprazole (ABILIFY) 2 MG Tab Take 2 tablets (4 mg total) by mouth once daily. 180 tablet 3    aspirin (ECOTRIN) 81 MG EC tablet Take 81 mg by mouth once daily.        budesonide 180mcg (PULMICORT FLEXHALER) 180 mcg/actuation AePB Inhale 2 puffs into the lungs 2 (two) times daily. 1 each 5    clopidogrel (PLAVIX) 75 mg tablet Take 75 mg by mouth once daily.       CRESTOR 10 mg tablet TAKE 1 TABLET BY MOUTH EVERY DAY 30 tablet 6    CRESTOR 10 mg tablet TAKE 1 TABLET BY MOUTH EVERY DAY 30 tablet 6    doxycycline (VIBRA-TABS) 100 MG tablet Take 1 tablet (100 mg total) by mouth every 12 (twelve) hours. 20 tablet 1    duloxetine (CYMBALTA) 30 MG capsule Take 1 capsule (30 mg total) by mouth once daily. 30 capsule 11    levalbuterol (XOPENEX HFA) 45 mcg/actuation inhaler Inhale 2 puffs into the lungs every 6 (six) hours as needed for Wheezing or Shortness of Breath. 1 Inhaler 4    meclizine (ANTIVERT) 25 mg tablet Take 25 mg by mouth 3 (three) times daily as needed.      OMEGA-3S/DHA/EPA/FISH OIL (OMEGA 3 ORAL) Take by mouth once daily.      prednisoLONE acetate (PRED FORTE) 1 % DrpS   0    predniSONE (DELTASONE) 10 MG tablet Take 3 tabs per day x 5 days, then 2 tabs per day x 5 days, then 1 tab per day x 5 days, then stop. Take with food 30 tablet 1    PULMICORT FLEXHALER 180 mcg/actuation AePB USE 2 PUFFS 2 TIMES DAILY AS DIRECTED 1 each 12    rosuvastatin (CRESTOR) 10 MG tablet Take 1 tablet (10 mg total) by mouth once daily. 30 tablet 11    valacyclovir (VALTREX) 500 MG tablet Take 500 mg by mouth once daily.      vit C,E,Zn,Cu glu-om3-lut-clau (OCUVITE ADULT 50+) 250-5-1 mg Cap Take by mouth once daily.       No facility-administered encounter medications on file as of 3/21/2018.      Orders Placed This Encounter   Procedures    X-Ray Chest PA And Lateral     Standing Status:   Future     Standing Expiration Date:   3/21/2019     Plan:     Continue present respiratory medications (roles reviewed at today's visit).  Discussed the early role for antibiotics for treatment of respiratory infection.  Return visit 6 months with CXR before.

## 2018-04-16 RX ORDER — BUDESONIDE 180 UG/1
AEROSOL, POWDER RESPIRATORY (INHALATION)
Refills: 10 | OUTPATIENT
Start: 2018-04-16

## 2018-04-19 RX ORDER — BUDESONIDE 180 UG/1
AEROSOL, POWDER RESPIRATORY (INHALATION)
Qty: 1 EACH | Refills: 10 | Status: SHIPPED | OUTPATIENT
Start: 2018-04-19 | End: 2019-05-24

## 2018-07-02 ENCOUNTER — PATIENT MESSAGE (OUTPATIENT)
Dept: PULMONOLOGY | Facility: CLINIC | Age: 83
End: 2018-07-02

## 2018-08-09 ENCOUNTER — OFFICE VISIT (OUTPATIENT)
Dept: PULMONOLOGY | Facility: CLINIC | Age: 83
End: 2018-08-09
Payer: MEDICARE

## 2018-08-09 ENCOUNTER — HOSPITAL ENCOUNTER (OUTPATIENT)
Dept: RADIOLOGY | Facility: HOSPITAL | Age: 83
Discharge: HOME | End: 2018-08-09
Attending: INTERNAL MEDICINE
Payer: MEDICARE

## 2018-08-09 VITALS
WEIGHT: 196.44 LBS | HEART RATE: 88 BPM | BODY MASS INDEX: 29.09 KG/M2 | DIASTOLIC BLOOD PRESSURE: 76 MMHG | OXYGEN SATURATION: 96 % | HEIGHT: 69 IN | RESPIRATION RATE: 14 BRPM | SYSTOLIC BLOOD PRESSURE: 136 MMHG

## 2018-08-09 DIAGNOSIS — J84.10 POSTINFLAMMATORY PULMONARY FIBROSIS: ICD-10-CM

## 2018-08-09 DIAGNOSIS — J43.2 CENTRILOBULAR EMPHYSEMA: Primary | ICD-10-CM

## 2018-08-09 DIAGNOSIS — J43.2 CENTRILOBULAR EMPHYSEMA: ICD-10-CM

## 2018-08-09 DIAGNOSIS — I25.810 CORONARY ARTERY DISEASE INVOLVING CORONARY BYPASS GRAFT OF NATIVE HEART WITHOUT ANGINA PECTORIS: ICD-10-CM

## 2018-08-09 DIAGNOSIS — C67.9 MALIGNANT NEOPLASM OF URINARY BLADDER, UNSPECIFIED SITE: ICD-10-CM

## 2018-08-09 PROCEDURE — 99999 PR PBB SHADOW E&M-EST. PATIENT-LVL III: CPT | Mod: PBBFAC,,, | Performed by: INTERNAL MEDICINE

## 2018-08-09 PROCEDURE — 99213 OFFICE O/P EST LOW 20 MIN: CPT | Mod: PBBFAC,25 | Performed by: INTERNAL MEDICINE

## 2018-08-09 PROCEDURE — 99214 OFFICE O/P EST MOD 30 MIN: CPT | Mod: S$PBB,,, | Performed by: INTERNAL MEDICINE

## 2018-08-09 PROCEDURE — 71046 X-RAY EXAM CHEST 2 VIEWS: CPT | Mod: TC,FY

## 2018-08-09 PROCEDURE — 71046 X-RAY EXAM CHEST 2 VIEWS: CPT | Mod: 26,,, | Performed by: RADIOLOGY

## 2018-08-09 RX ORDER — LEVALBUTEROL TARTRATE 45 UG/1
2 AEROSOL, METERED ORAL EVERY 6 HOURS PRN
Qty: 1 INHALER | Refills: 4 | Status: SHIPPED | OUTPATIENT
Start: 2018-08-09 | End: 2019-07-26 | Stop reason: CLARIF

## 2018-08-09 RX ORDER — LISINOPRIL 5 MG/1
5 TABLET ORAL DAILY
Refills: 4 | COMMUNITY
Start: 2018-06-28 | End: 2019-02-04

## 2018-08-09 RX ORDER — AMLODIPINE BESYLATE 2.5 MG/1
5 TABLET ORAL
COMMUNITY
End: 2018-08-09

## 2018-08-09 NOTE — PATIENT INSTRUCTIONS
CXR today (phone report).  Refills for Xopenex ordered.  Continue Pulmicort Flex 180 2 puffs twice daily.  Return visit 6 months with Spirometry.

## 2018-08-14 ENCOUNTER — PATIENT MESSAGE (OUTPATIENT)
Dept: PULMONOLOGY | Facility: CLINIC | Age: 83
End: 2018-08-14

## 2018-10-26 DIAGNOSIS — G25.69 TICS OF ORGANIC ORIGIN: ICD-10-CM

## 2018-10-26 RX ORDER — ARIPIPRAZOLE 2 MG/1
TABLET ORAL
Qty: 180 TABLET | Refills: 2 | Status: SHIPPED | OUTPATIENT
Start: 2018-10-26 | End: 2019-07-19 | Stop reason: SDUPTHER

## 2019-01-15 ENCOUNTER — HOSPITAL ENCOUNTER (OUTPATIENT)
Dept: RADIOLOGY | Facility: HOSPITAL | Age: 84
Discharge: HOME | End: 2019-01-15
Attending: ORTHOPAEDIC SURGERY
Payer: MEDICARE

## 2019-01-15 ENCOUNTER — OFFICE VISIT (OUTPATIENT)
Dept: ORTHOPEDICS | Facility: CLINIC | Age: 84
End: 2019-01-15
Payer: MEDICARE

## 2019-01-15 VITALS
HEART RATE: 80 BPM | DIASTOLIC BLOOD PRESSURE: 77 MMHG | BODY MASS INDEX: 29.12 KG/M2 | TEMPERATURE: 98 F | WEIGHT: 197.19 LBS | SYSTOLIC BLOOD PRESSURE: 160 MMHG

## 2019-01-15 DIAGNOSIS — M17.9 OSTEOARTHRITIS OF KNEE, UNSPECIFIED (CODE): ICD-10-CM

## 2019-01-15 DIAGNOSIS — G89.29 CHRONIC PAIN OF RIGHT KNEE: ICD-10-CM

## 2019-01-15 DIAGNOSIS — G89.29 CHRONIC PAIN OF RIGHT KNEE: Primary | ICD-10-CM

## 2019-01-15 DIAGNOSIS — M25.561 CHRONIC PAIN OF RIGHT KNEE: Primary | ICD-10-CM

## 2019-01-15 DIAGNOSIS — M25.561 CHRONIC PAIN OF RIGHT KNEE: ICD-10-CM

## 2019-01-15 PROCEDURE — 99214 OFFICE O/P EST MOD 30 MIN: CPT | Mod: PBBFAC,25 | Performed by: ORTHOPAEDIC SURGERY

## 2019-01-15 PROCEDURE — 73560 X-RAY EXAM OF KNEE 1 OR 2: CPT | Mod: 26,XS,RT, | Performed by: RADIOLOGY

## 2019-01-15 PROCEDURE — 73562 XR KNEE ORTHO RIGHT: ICD-10-PCS | Mod: 26,RT,, | Performed by: RADIOLOGY

## 2019-01-15 PROCEDURE — 99204 PR OFFICE/OUTPT VISIT, NEW, LEVL IV, 45-59 MIN: ICD-10-PCS | Mod: S$PBB,,, | Performed by: ORTHOPAEDIC SURGERY

## 2019-01-15 PROCEDURE — 73560 X-RAY EXAM OF KNEE 1 OR 2: CPT | Mod: TC,LT,59

## 2019-01-15 PROCEDURE — 73560 X-RAY EXAM OF KNEE 1 OR 2: CPT | Mod: TC,RT

## 2019-01-15 PROCEDURE — 99204 OFFICE O/P NEW MOD 45 MIN: CPT | Mod: S$PBB,,, | Performed by: ORTHOPAEDIC SURGERY

## 2019-01-15 PROCEDURE — 99999 PR PBB SHADOW E&M-EST. PATIENT-LVL IV: ICD-10-PCS | Mod: PBBFAC,,, | Performed by: ORTHOPAEDIC SURGERY

## 2019-01-15 PROCEDURE — 99999 PR PBB SHADOW E&M-EST. PATIENT-LVL IV: CPT | Mod: PBBFAC,,, | Performed by: ORTHOPAEDIC SURGERY

## 2019-01-15 PROCEDURE — 73560 X-RAY EXAM OF KNEE 1 OR 2: CPT | Mod: 26,RT,, | Performed by: RADIOLOGY

## 2019-01-15 PROCEDURE — 73560 XR KNEE ORTHO RIGHT: ICD-10-PCS | Mod: 26,XS,RT, | Performed by: RADIOLOGY

## 2019-01-15 PROCEDURE — 73560 XR KNEE 1 OR 2 VIEW RIGHT: ICD-10-PCS | Mod: 26,RT,, | Performed by: RADIOLOGY

## 2019-01-15 PROCEDURE — 73562 X-RAY EXAM OF KNEE 3: CPT | Mod: 26,RT,, | Performed by: RADIOLOGY

## 2019-01-15 RX ORDER — ARIPIPRAZOLE 2 MG/1
2 TABLET ORAL
COMMUNITY
End: 2019-02-04

## 2019-01-15 RX ORDER — NAPROXEN SODIUM 220 MG/1
81 TABLET, FILM COATED ORAL
COMMUNITY
End: 2019-02-04

## 2019-01-15 RX ORDER — ROSUVASTATIN CALCIUM 10 MG/1
10 TABLET, COATED ORAL
COMMUNITY
End: 2019-02-04

## 2019-01-15 NOTE — PROGRESS NOTES
HPI:    Mayur Roblero is a 83 y.o. male who is here today for right knee pain x 2-3 years. Had left knee TKA 2011. History of 4 stents placed, last placed 2008. On plavix. COPD.   Chief Complaint   Patient presents with    Right Knee - Pain   .     Duration: 2 years  Intensity: severe  Character of pain: sharp  Location: He reports that the pain is predominately  medial  Patient's pain increases with activity.  Pain is increased with weightbearing and interferes with activities of daily living.    He has tried conservative management including NSAIDS, injections, and activity modification without relief.    He has discussed options with his family and wishes to schedule TKA.     PMSFSH reviewed per clinic record       Past Medical History:   Diagnosis Date    Bladder cancer     Coronary artery disease     Emphysema of lung     Hyperlipidemia     Pneumonia     Pulmonary nodule           Current Outpatient Medications:     ALPHAGAN P 0.1 % Drop, , Disp: , Rfl: 0    ARIPiprazole (ABILIFY) 2 MG Tab, TAKE 2 TABLETS EVERY DAY, Disp: 180 tablet, Rfl: 2    aspirin (ECOTRIN) 81 MG EC tablet, Take 81 mg by mouth once daily.  , Disp: , Rfl:     budesonide 180mcg (PULMICORT FLEXHALER) 180 mcg/actuation AePB, Inhale 2 puffs into the lungs 2 (two) times daily., Disp: 1 each, Rfl: 5    clopidogrel (PLAVIX) 75 mg tablet, Take 75 mg by mouth once daily., Disp: , Rfl:     CRESTOR 10 mg tablet, TAKE 1 TABLET BY MOUTH EVERY DAY, Disp: 30 tablet, Rfl: 6    CRESTOR 10 mg tablet, TAKE 1 TABLET BY MOUTH EVERY DAY, Disp: 30 tablet, Rfl: 6    levalbuterol (XOPENEX HFA) 45 mcg/actuation inhaler, Inhale 2 puffs into the lungs every 6 (six) hours as needed for Wheezing or Shortness of Breath., Disp: 1 Inhaler, Rfl: 4    lisinopril (PRINIVIL,ZESTRIL) 5 MG tablet, Take 5 mg by mouth once daily., Disp: , Rfl: 4    meclizine (ANTIVERT) 25 mg tablet, Take 25 mg by mouth 3 (three) times daily as needed., Disp: , Rfl:      OMEGA-3S/DHA/EPA/FISH OIL (OMEGA 3 ORAL), Take by mouth once daily., Disp: , Rfl:     prednisoLONE acetate (PRED FORTE) 1 % DrpS, , Disp: , Rfl: 0    predniSONE (DELTASONE) 10 MG tablet, Take 3 tabs per day x 5 days, then 2 tabs per day x 5 days, then 1 tab per day x 5 days, then stop. Take with food, Disp: 30 tablet, Rfl: 1    PULMICORT FLEXHALER 180 mcg/actuation AePB, USE 2 PUFFS 2 TIMES DAILY AS DIRECTED, Disp: 1 each, Rfl: 10    rosuvastatin (CRESTOR) 10 MG tablet, Take 1 tablet (10 mg total) by mouth once daily., Disp: 30 tablet, Rfl: 11    valacyclovir (VALTREX) 500 MG tablet, Take 500 mg by mouth once daily., Disp: , Rfl:     vit C,E,Zn,Cu glu-om3-lut-clau (OCUVITE ADULT 50+) 250-5-1 mg Cap, Take by mouth once daily., Disp: , Rfl:     ARIPiprazole (ABILIFY) 2 MG Tab, Take 2 mg by mouth., Disp: , Rfl:     aspirin 81 MG Chew, Take 81 mg by mouth., Disp: , Rfl:     budesonide 180mcg (PULMICORT FLEXHALER) 180 mcg/actuation AePB, Inhale 2 puffs into the lungs., Disp: , Rfl:     duloxetine (CYMBALTA) 30 MG capsule, Take 1 capsule (30 mg total) by mouth once daily., Disp: 30 capsule, Rfl: 11    rosuvastatin (CRESTOR) 10 MG tablet, Take 10 mg by mouth., Disp: , Rfl:      Review of patient's allergies indicates:   Allergen Reactions    Dilaudid [hydromorphone] Nausea And Vomiting    Keflex [cephalexin] Hives    Morphine Anaphylaxis    Moxifloxacin Other (See Comments)     Fever  Fever    Tiotropium bromide Anaphylaxis    Codeine     Ondansetron         ROS  Constitutional: Negative for fever, Negative for weight loss  HENT Negative for congestion  Cardiovascular: Negative chest pain  Respiratory: Negative Shortness of breath  Heme: Positive excessive bleeding (on Plavix)  Skin:NegativeItching, Negative breakdown  Musculoskeletal:Negative for back pain, Positive for joint pain, Negative muscle pain, Negative muscle weakness  Neurological: Negative for numbness and paresthesias    Psychiatric/Behavioral: Negative altered mental status, Negative for depression    Physical Exam:   BP (!) 160/77   Pulse 80   Temp 97.7 °F (36.5 °C)   Wt 89.4 kg (197 lb 3.2 oz)   BMI 29.12 kg/m²   General appearance: This is a well-developed, Well nourished male No obvious acute distress.  Psychiatric: normal mood and affect;  pleasant and conversant; judgment and thought content normal  Gait is coordinated. Patient has antalgic gait to the right  Cardiovascular: There are no swelling or varicosities present.   Respiratory: normal respiratory effort   Lymphatic: no adenopathy   Neurologic: alert and oriented to person, place, and time   Deep Tendon Reflexes are normal;  Coordination and Balance: Normal   Musculoskeletal   Neck    ROM shows normal flexion and extension and lateral rotation    Palpation: Non-tender    Stability is normal    Strength is normal    Skin is normal without masses and lesions    Sensation is intact to light touch   Back    ROM showsnormal flexion, extension and     rotation    Palpation shows no masses    Stability is normal    Strength to flexion and extension well maintained    Core strength is diminished    Skin shows no rashes or cafe au lait spots;     Sensation is intact to light touch  Right hip   Range of motion normal    Left Hip  Range of motionnormal    Right Knee  Swelling None  TendernessMedial joint line  Range of Motion: 0-120  Motion is painfulYes  Crepitance presentYes    Right Leg  Neurologic Intact  Pulses Intact    Left Knee: Swelling None  TendernessNone  Range of Motion: 0-120   Motion is painful No    Left Leg   Neurologic Intact  Pulses Intact    Radiograph: Show severe degenerative arthritis with subchondral sclerosis, periarticular osteophytes and narrowing of joint space.  Angle: significant varus right knee    Physical therapy is contraindicated due to potential bone loss on this severe arthritic joint.    Assessment:  Knee arthritis right in varus.     Needs clearance from cardiologist to stop plavix at least 1 week prior to surgery and for several days post op.    He will need to be cleared in our PreOp center.         He  has a past medical history of Bladder cancer, Coronary artery disease, Emphysema of lung, Hyperlipidemia, Pneumonia, and Pulmonary nodule. We will have to take this into account. He  will be followed by the hospitalist service while in the hospital.       We have gone over the hospitalization and recovery with him as well.  This is typically around 2 weeks on a walker and transition to a cane after that.  He will have home health likely for 3-4 weeks and then transition to outpatient if necessary.  He is agreement with this plan of care and is ready to proceed.  I will see him back for clinical recheck at the 2-week postop nehemiah.  I will see him back for clinical recheck for any other questions or problems as needed and certainly for any other issues in the interim.    We have discussed risks of total knee replacement which include but are not limited to blood clots in the legs that can travel to the lungs (pulmonary embolism). Pulmonary embolism can cause shortness of breath, chest pain, and even shock. Other risks include urinary tract infection, nausea and vomiting (usually related to pain medication), chronic knee pain and stiffness, bleeding into the knee joint, nerve damage, blood vessel injury, and infection of the knee which can require re-operation. Furthermore, the risks of anesthesia include potential heart, lung, kidney, and liver damage.

## 2019-01-23 ENCOUNTER — ANESTHESIA EVENT (OUTPATIENT)
Dept: SURGERY | Facility: HOSPITAL | Age: 84
DRG: 470 | End: 2019-01-23
Payer: MEDICARE

## 2019-01-23 DIAGNOSIS — M79.606 PAIN OF LOWER EXTREMITY, UNSPECIFIED LATERALITY: ICD-10-CM

## 2019-01-23 DIAGNOSIS — I10 HYPERTENSION, UNSPECIFIED TYPE: ICD-10-CM

## 2019-01-23 DIAGNOSIS — M79.606 PAIN OF LOWER EXTREMITY, UNSPECIFIED LATERALITY: Primary | ICD-10-CM

## 2019-01-23 DIAGNOSIS — Z91.89 AT RISK OF UTI: ICD-10-CM

## 2019-01-23 DIAGNOSIS — I10 HYPERTENSION, UNSPECIFIED TYPE: Primary | ICD-10-CM

## 2019-01-23 NOTE — PRE ADMISSION SCREENING
Anesthesia Assessment: Preoperative EQUATION    Planned Procedure: Procedure(s) (LRB):  REPLACEMENT-KNEE-TOTAL (Right)  Requested Anesthesia Type:Femoral Block  Surgeon: John L. Ochsner Jr., MD  Service: Orthopedics  Known or anticipated Date of Surgery:2/15/2019    Plan:    Testing:  Hematology Profile, BMP, PT/INR and EKG   Pre-anesthesia  visit       Visit focus: possible regional anesthesia and/or nerve block and Plavix management     Consultation:IM Perioperative Hospitalist     Jazmyne Tenorio RN 01/23/2019

## 2019-01-23 NOTE — ANESTHESIA PREPROCEDURE EVALUATION
Anesthesia Assessment: Preoperative EQUATION    Planned Procedure: Procedure(s) (LRB):  REPLACEMENT-KNEE-TOTAL (Right)  Requested Anesthesia Type:Femoral Block  Surgeon: John L. [facility]ner Jr., MD  Service: Orthopedics  Known or anticipated Date of Surgery:2/15/2019    Plan:    Testing:  Hematology Profile, BMP, PT/INR, UA and EKG   Pre-anesthesia  visit       Visit focus: possible regional anesthesia and/or nerve block and Plavix management(okay to hold 7 days before sx per Dr. Del Toro, cardiology)-scanned     Consultation:IM Perioperative Hospitalist     Jazmyne Tenorio, GUS 01/23/2019 01/23/2019  Mayur Roblero is a 83 y.o., male.    Anesthesia Evaluation    I have reviewed the Patient Summary Reports.    I have reviewed the Nursing Notes.   I have reviewed the Medications.     Review of Systems  Anesthesia Hx:  No problems with previous Anesthesia  History of prior surgery of interest to airway management or planning: Previous anesthesia: General Denies Family Hx of Anesthesia complications.   Denies Personal Hx of Anesthesia complications.   Social:  Former Smoker, Social Alcohol Use Quit smoking 2007   Hematology/Oncology:        Hematology Comments: On anticoagulation - pt states Plavix d/c'd 7 days ago Current/Recent Cancer. Oncology: bladder-2008.  Oncology Comments: Bladder CA     EENT/Dental:EENT/Dental Normal   Cardiovascular:   Exercise tolerance: poor Hypertension CAD (MI 2008: stents x4(first 2008, 2015, 2017 x2)Per outside records-RCA, mid and ostial, CX lesion-scanned)   hyperlipidemia Can climb two flight of stairs.  Walks around hospital for appt.  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% , Right stenosis is  20-39% Disorder of Cardiac Rhythm, Premature Ventricular Contraction (PVC)  Disorder of Cardiac Conduction, Intraventricular Conduct Defect,  Right Bundle Branch Block(RBBB)    Pulmonary:   Pneumonia (30+ years ago) COPD, moderate Shortness of breath Pulmonary nodule Chronic Obstructive Pulmonary Disease (COPD): Emphysema  Possible Obstructive Sleep Apnea , (STOP/BANG) Symptoms S - Snoring (loud), A - Age > 50, N - Neck circumference > 40 cms, G - Gender (Male > Female) and P - Pressure being treated for high BP    Renal/:   Chronic Renal Disease, CRI Bladder cancer Kidney Function/Disease, Chronic Kidney Disease (CKD) , CKD Stage III (GFR 30-59)    Hepatic/GI:  Hepatic/GI Normal Denies PUD.  Denies GERD. Denies Liver Disease.    Musculoskeletal:   Arthritis   Musculoskeletal General/Symptoms: Functional capacity is ambulatory without assistance.  Joint Disease:  Arthritis, Osteoarthritis    Neurological:  Neurology Normal  Pain , onset is chronic  Osteoarthritis    Endocrine:  Endocrine Normal    Dermatological:  Skin Normal    Psych:  Psychiatric Normal           Physical Exam  General:  Well nourished    Airway/Jaw/Neck:  Airway Findings: Mouth Opening: Normal Tongue: Large  General Airway Assessment: Adult  Mallampati: III  Improves to II with phonation.  TM Distance: Normal, at least 6 cm  Jaw/Neck Findings:  Neck ROM: Normal ROM     Eyes/Ears/Nose:  EYES/EARS/NOSE FINDINGS: Normal   Dental:  Dental Findings: Upper Dentures, Lower Dentures   Chest/Lungs:  Chest/Lungs Findings: Decreased Breath Sounds Bilateral     Heart/Vascular:  Heart Findings: Rate: Normal  Rhythm: Regular Rhythm  Sounds: Normal  Heart murmur: negative Vascular Findings: Normal    Abdomen:  Abdomen Findings: Normal    Musculoskeletal:  Musculoskeletal Findings: Normal   Skin:  Skin Findings: Normal    Mental Status:  Mental Status Findings:  Cooperative, Alert and Oriented         Labs and ekg reviewed    Discharge plans: home with wife Alissa     Please refer to , Internal Medicine, perioperative risk assessment and recommendations.    Dr. Del Toro, cardiology-clearance  and office note scanned scanned 2/1/2019 and Plavix instructions    Jazmyne Carrion RN 02/04/2019     ADDENDUM JAZMYNE CARRION RN 2/10/2019:  Dr. Cornejo 2/6/2019:  Mild obstruction with preserved FEV1 stable for years.  Anaphylaxis to LABA/LAMA  Only uses ICS.   Preop pulmonary/respiratory exam    -  Primary  Patient is clinically stable.  Low risk for post operative respiratory complications with usual post op care including early ambulation, minimizing narcotics and frequent use of incentive spirometry             Anesthesia Plan  Type of Anesthesia, risks & benefits discussed:  Anesthesia Type:  general, spinal  Patient's Preference:   Intra-op Monitoring Plan: standard ASA monitors  Intra-op Monitoring Plan Comments:   Post Op Pain Control Plan: peripheral nerve block  Post Op Pain Control Plan Comments:   Induction:   IV  Beta Blocker:  Patient is not currently on a Beta-Blocker (No further documentation required).       Informed Consent: Patient understands risks and agrees with Anesthesia plan.  Questions answered. Anesthesia consent signed with patient.  ASA Score: 3     Day of Surgery Review of History & Physical:            Ready For Surgery From Anesthesia Perspective.

## 2019-01-24 ENCOUNTER — TELEPHONE (OUTPATIENT)
Dept: PREADMISSION TESTING | Facility: HOSPITAL | Age: 84
End: 2019-01-24

## 2019-01-24 NOTE — TELEPHONE ENCOUNTER
----- Message from Jazmyne Tenorio RN sent at 1/23/2019  2:57 PM CST -----  Anesthesia Assessment: Preoperative EQUATION    Planned Procedure: Procedure(s) (LRB):  REPLACEMENT-KNEE-TOTAL (Right)  Requested Anesthesia Type:Femoral Block  Surgeon: John L. [facility]ner Jr., MD  Service: Orthopedics  Known or anticipated Date of Surgery:2/15/2019    Testing:  Hematology Profile, BMP, PT/INR , UA and EKG   Pre-anesthesia  visit  -Jazmyne     Consultation: Perioperative Hospitalist-Dr. Kesha France find out from patient why he is on Plavix and who manages the Plavix.  Send message to that doctor to ask for a 7 day hold.  If he has an outside cardiologist, get outside office note and cardiac testing.    Also, patient is due for his Pulmonary f/u.  Help him to get schedule with his pulmonary doctor-Dr. Guerrero before surgery if possible.    Jazmyne

## 2019-01-30 ENCOUNTER — TELEPHONE (OUTPATIENT)
Dept: PULMONOLOGY | Facility: CLINIC | Age: 84
End: 2019-01-30

## 2019-01-30 DIAGNOSIS — J44.9 CHRONIC OBSTRUCTIVE PULMONARY DISEASE, UNSPECIFIED COPD TYPE: Primary | ICD-10-CM

## 2019-02-04 ENCOUNTER — INITIAL CONSULT (OUTPATIENT)
Dept: INTERNAL MEDICINE | Facility: CLINIC | Age: 84
End: 2019-02-04
Payer: MEDICARE

## 2019-02-04 ENCOUNTER — HOSPITAL ENCOUNTER (OUTPATIENT)
Dept: CARDIOLOGY | Facility: CLINIC | Age: 84
Discharge: HOME | End: 2019-02-04
Payer: MEDICARE

## 2019-02-04 ENCOUNTER — OFFICE VISIT (OUTPATIENT)
Dept: ORTHOPEDICS | Facility: CLINIC | Age: 84
End: 2019-02-04
Payer: MEDICARE

## 2019-02-04 ENCOUNTER — HOSPITAL ENCOUNTER (OUTPATIENT)
Dept: PREADMISSION TESTING | Facility: HOSPITAL | Age: 84
Discharge: HOME | End: 2019-02-04
Attending: ANESTHESIOLOGY
Payer: MEDICARE

## 2019-02-04 VITALS — HEIGHT: 69 IN | BODY MASS INDEX: 29.24 KG/M2 | WEIGHT: 197.44 LBS

## 2019-02-04 VITALS
HEART RATE: 99 BPM | SYSTOLIC BLOOD PRESSURE: 137 MMHG | DIASTOLIC BLOOD PRESSURE: 64 MMHG | HEIGHT: 70 IN | WEIGHT: 199.5 LBS | TEMPERATURE: 97 F | OXYGEN SATURATION: 95 % | BODY MASS INDEX: 28.56 KG/M2

## 2019-02-04 DIAGNOSIS — I49.3 PVCS (PREMATURE VENTRICULAR CONTRACTIONS): ICD-10-CM

## 2019-02-04 DIAGNOSIS — Z01.818 PREOP EXAMINATION: Primary | ICD-10-CM

## 2019-02-04 DIAGNOSIS — R00.1 BRADYCARDIA: ICD-10-CM

## 2019-02-04 DIAGNOSIS — E78.5 HYPERLIPIDEMIA, UNSPECIFIED HYPERLIPIDEMIA TYPE: ICD-10-CM

## 2019-02-04 DIAGNOSIS — R25.1 TREMOR: ICD-10-CM

## 2019-02-04 DIAGNOSIS — Z95.5 S/P CORONARY ARTERY STENT PLACEMENT: ICD-10-CM

## 2019-02-04 DIAGNOSIS — M79.606 PAIN OF LOWER EXTREMITY, UNSPECIFIED LATERALITY: ICD-10-CM

## 2019-02-04 DIAGNOSIS — I10 ESSENTIAL HYPERTENSION: ICD-10-CM

## 2019-02-04 DIAGNOSIS — J43.9 PULMONARY EMPHYSEMA, UNSPECIFIED EMPHYSEMA TYPE: ICD-10-CM

## 2019-02-04 DIAGNOSIS — N18.30 CKD (CHRONIC KIDNEY DISEASE) STAGE 3, GFR 30-59 ML/MIN: ICD-10-CM

## 2019-02-04 DIAGNOSIS — C67.9 MALIGNANT NEOPLASM OF URINARY BLADDER, UNSPECIFIED SITE: ICD-10-CM

## 2019-02-04 DIAGNOSIS — R06.02 SHORTNESS OF BREATH: ICD-10-CM

## 2019-02-04 DIAGNOSIS — M17.11 PRIMARY OSTEOARTHRITIS OF RIGHT KNEE: Primary | ICD-10-CM

## 2019-02-04 DIAGNOSIS — I10 HYPERTENSION, UNSPECIFIED TYPE: ICD-10-CM

## 2019-02-04 DIAGNOSIS — R42 ORTHOSTATIC DIZZINESS: ICD-10-CM

## 2019-02-04 PROCEDURE — 93010 ELECTROCARDIOGRAM REPORT: CPT | Mod: S$PBB,,, | Performed by: INTERNAL MEDICINE

## 2019-02-04 PROCEDURE — 93005 ELECTROCARDIOGRAM TRACING: CPT | Mod: PBBFAC | Performed by: INTERNAL MEDICINE

## 2019-02-04 PROCEDURE — 99999 PR PBB SHADOW E&M-EST. PATIENT-LVL IV: CPT | Mod: PBBFAC,,, | Performed by: PHYSICIAN ASSISTANT

## 2019-02-04 PROCEDURE — 99214 OFFICE O/P EST MOD 30 MIN: CPT | Mod: PBBFAC,25 | Performed by: PHYSICIAN ASSISTANT

## 2019-02-04 PROCEDURE — 99999 PR PBB SHADOW E&M-EST. PATIENT-LVL II: CPT | Mod: PBBFAC,,, | Performed by: HOSPITALIST

## 2019-02-04 PROCEDURE — 99499 UNLISTED E&M SERVICE: CPT | Mod: S$PBB,,, | Performed by: PHYSICIAN ASSISTANT

## 2019-02-04 PROCEDURE — 99212 OFFICE O/P EST SF 10 MIN: CPT | Mod: PBBFAC,25,27 | Performed by: HOSPITALIST

## 2019-02-04 PROCEDURE — 99999 PR PBB SHADOW E&M-EST. PATIENT-LVL II: ICD-10-PCS | Mod: PBBFAC,,, | Performed by: HOSPITALIST

## 2019-02-04 PROCEDURE — 99999 PR PBB SHADOW E&M-EST. PATIENT-LVL IV: ICD-10-PCS | Mod: PBBFAC,,, | Performed by: PHYSICIAN ASSISTANT

## 2019-02-04 PROCEDURE — 99204 PR OFFICE/OUTPT VISIT, NEW, LEVL IV, 45-59 MIN: ICD-10-PCS | Mod: S$PBB,,, | Performed by: HOSPITALIST

## 2019-02-04 PROCEDURE — 99499 NO LOS: ICD-10-PCS | Mod: S$PBB,,, | Performed by: PHYSICIAN ASSISTANT

## 2019-02-04 PROCEDURE — 99204 OFFICE O/P NEW MOD 45 MIN: CPT | Mod: S$PBB,,, | Performed by: HOSPITALIST

## 2019-02-04 PROCEDURE — 93010 EKG 12-LEAD: ICD-10-PCS | Mod: S$PBB,,, | Performed by: INTERNAL MEDICINE

## 2019-02-04 RX ORDER — NALOXONE HCL 0.4 MG/ML
0.02 VIAL (ML) INJECTION
Status: CANCELLED | OUTPATIENT
Start: 2019-02-04

## 2019-02-04 RX ORDER — ASPIRIN 81 MG/1
81 TABLET ORAL 2 TIMES DAILY
Status: CANCELLED | OUTPATIENT
Start: 2019-02-04

## 2019-02-04 RX ORDER — MUPIROCIN 20 MG/G
1 OINTMENT TOPICAL
Status: CANCELLED | OUTPATIENT
Start: 2019-02-04

## 2019-02-04 RX ORDER — MORPHINE SULFATE 10 MG/ML
2 INJECTION, SOLUTION INTRAMUSCULAR; INTRAVENOUS
Status: CANCELLED | OUTPATIENT
Start: 2019-02-04

## 2019-02-04 RX ORDER — ACETAMINOPHEN 10 MG/ML
1000 INJECTION, SOLUTION INTRAVENOUS ONCE
Status: CANCELLED | OUTPATIENT
Start: 2019-02-04 | End: 2019-02-04

## 2019-02-04 RX ORDER — FAMOTIDINE 20 MG/1
20 TABLET, FILM COATED ORAL DAILY
Status: CANCELLED | OUTPATIENT
Start: 2019-02-04

## 2019-02-04 RX ORDER — MUPIROCIN 20 MG/G
1 OINTMENT TOPICAL 2 TIMES DAILY
Status: CANCELLED | OUTPATIENT
Start: 2019-02-04 | End: 2019-02-09

## 2019-02-04 RX ORDER — PREGABALIN 25 MG/1
75 CAPSULE ORAL NIGHTLY
Status: CANCELLED | OUTPATIENT
Start: 2019-02-04

## 2019-02-04 RX ORDER — FENTANYL CITRATE 50 UG/ML
25 INJECTION, SOLUTION INTRAMUSCULAR; INTRAVENOUS EVERY 5 MIN PRN
Status: CANCELLED | OUTPATIENT
Start: 2019-02-04

## 2019-02-04 RX ORDER — POLYETHYLENE GLYCOL 3350 17 G/17G
17 POWDER, FOR SOLUTION ORAL DAILY
Status: CANCELLED | OUTPATIENT
Start: 2019-02-04

## 2019-02-04 RX ORDER — SODIUM CHLORIDE 9 MG/ML
INJECTION, SOLUTION INTRAVENOUS CONTINUOUS
Status: CANCELLED | OUTPATIENT
Start: 2019-02-04 | End: 2019-02-05

## 2019-02-04 RX ORDER — BISACODYL 10 MG
10 SUPPOSITORY, RECTAL RECTAL EVERY 12 HOURS PRN
Status: CANCELLED | OUTPATIENT
Start: 2019-02-04

## 2019-02-04 RX ORDER — ONDANSETRON 2 MG/ML
4 INJECTION INTRAMUSCULAR; INTRAVENOUS EVERY 8 HOURS PRN
Status: CANCELLED | OUTPATIENT
Start: 2019-02-04

## 2019-02-04 RX ORDER — ACETAMINOPHEN 500 MG
1000 TABLET ORAL EVERY 6 HOURS
Status: CANCELLED | OUTPATIENT
Start: 2019-02-04 | End: 2019-02-06

## 2019-02-04 RX ORDER — OXYCODONE HYDROCHLORIDE 5 MG/1
5 TABLET ORAL
Status: CANCELLED | OUTPATIENT
Start: 2019-02-04

## 2019-02-04 RX ORDER — LIDOCAINE HYDROCHLORIDE 10 MG/ML
1 INJECTION, SOLUTION EPIDURAL; INFILTRATION; INTRACAUDAL; PERINEURAL
Status: CANCELLED | OUTPATIENT
Start: 2019-02-04

## 2019-02-04 RX ORDER — RAMELTEON 8 MG/1
8 TABLET ORAL NIGHTLY PRN
Status: CANCELLED | OUTPATIENT
Start: 2019-02-04

## 2019-02-04 RX ORDER — OXYCODONE HYDROCHLORIDE 5 MG/1
10 TABLET ORAL
Status: CANCELLED | OUTPATIENT
Start: 2019-02-04

## 2019-02-04 RX ORDER — OXYCODONE HYDROCHLORIDE 5 MG/1
15 TABLET ORAL
Status: CANCELLED | OUTPATIENT
Start: 2019-02-04

## 2019-02-04 RX ORDER — PREGABALIN 25 MG/1
75 CAPSULE ORAL
Status: CANCELLED | OUTPATIENT
Start: 2019-02-04

## 2019-02-04 RX ORDER — ROPIVACAINE HYDROCHLORIDE 2 MG/ML
8 INJECTION, SOLUTION EPIDURAL; INFILTRATION; PERINEURAL CONTINUOUS
Status: CANCELLED | OUTPATIENT
Start: 2019-02-04

## 2019-02-04 RX ORDER — MIDAZOLAM HYDROCHLORIDE 5 MG/ML
1 INJECTION INTRAMUSCULAR; INTRAVENOUS EVERY 5 MIN PRN
Status: CANCELLED | OUTPATIENT
Start: 2019-02-04

## 2019-02-04 RX ORDER — SODIUM CHLORIDE 9 MG/ML
INJECTION, SOLUTION INTRAVENOUS
Status: CANCELLED | OUTPATIENT
Start: 2019-02-04

## 2019-02-04 RX ORDER — SODIUM CHLORIDE 0.9 % (FLUSH) 0.9 %
3 SYRINGE (ML) INJECTION EVERY 8 HOURS PRN
Status: CANCELLED | OUTPATIENT
Start: 2019-02-04

## 2019-02-04 RX ORDER — AMOXICILLIN 250 MG
1 CAPSULE ORAL 2 TIMES DAILY
Status: CANCELLED | OUTPATIENT
Start: 2019-02-04

## 2019-02-04 NOTE — PROGRESS NOTES
Mayur Roblero is a 84 y.o. year old here today for a pre-operative visit in preparation for a Right total knee arthroplasty to be performed by  Dr. [facility]ner on 2/15/2019.  he was last seen and treated in the clinic on 1/15/2019. he will be medically optimized by the pre op center. There has been no significant change in medical status since last visit. No fever, chills, malaise, or unexplained weight change.      Allergies, Medications, past medical and surgical history reviewed.    Focused examination performed.    Patient declined to see Dr. Ochsner today in clinic.  All questions answered. Patient encouraged to call with questions. Contact information given.     Pre, zoran, and post operative procedures and expectations discussed. Questions were answered. Mayur Roblero has been educated and is ready to proceed with surgery. Approximately 30 minutes was spent discussing surgical outcomes, plans, procedures pre, zoran, and post operative expections and care.  Surgical consent signed.    Mayur Roblero will contact us if there are any questions, concerns, or changes in medical status prior to surgery.

## 2019-02-04 NOTE — ASSESSMENT & PLAN NOTE
Base line Creatinine 1.2   Stages of CKD discussed  Deleterious effects NSAID's , Beneficial effects of Hydration discussed   Tylenol as needed for pain     I  suggest monitoring renal function, in put and out put status zoran-operatively. I  suggest avoiding nephrotoxic medication including NSAIDs, COX2 inhibitors, intravenous contrast agent,avoiding hypotension to prevent further renal impairment.

## 2019-02-04 NOTE — ASSESSMENT & PLAN NOTE
Was commenced on BP Medication Jan 31 st , 2019   Unclear name   Suggested calling with Bp Med name   Home BP readings -130/60's  Recent BP readings in the record-Fluctuating   Hypertension-  Blood pressure is acceptable . I suggest  blood pressure  monitoring .I suggest addressing pain control as uncontrolled pain can increased blood pressure

## 2019-02-04 NOTE — ASSESSMENT & PLAN NOTE
Stable   Stress test 2015   The perfusion scan is free of evidence for myocardial ischemia or injury.   The ventricular volumes are normal at rest and stress.   Pulmonology evaluation on 2/6

## 2019-02-04 NOTE — OUTPATIENT SUBJECTIVE & OBJECTIVE
Outpatient Subjective & Objective     Chief complaint-Preoperative evaluation, Perioperative Medical management, complication reduction plan     Active cardiac conditions- none    Revised cardiac risk index predictors- coronary artery disease    Functional capacity -Examples of physical activity  house work and can take 1 flight of stairs----- He can undertake all the above activities without  chest pain,chest tightness,,dizziness,lightheadedness making his exercise tolerance more, than 4 Mets.   Winded on exertion, not new ,stable over time     Review of Systems   Constitutional: Negative for chills and fever.        No unusual weight changes     HENT:        STOPBANG score 4 / 8        Elevated BP  Age over 50 years  Neck size over 40 CM  Male gender   Eyes:        No unusual vision changes- None    Respiratory:        No cough , phlegm  Lisinopril caused cough - was chnaged  No Hemoptysis   Cardiovascular:        As noted   Gastrointestinal:        Bowels- Regular , every other day , once in 2-3 days   uptodate  with Colonoscopy per wife   No overt GI/ blood losses   Endocrine:        Prednisone use > 20 mg daily for 3 weeks- none   Genitourinary: Negative for dysuria.        No urinary hesitancy    Musculoskeletal:        As above      Skin: Negative for rash.   Neurological: Negative for syncope.        No unilateral weakness   Hematological:        ASA,plavix  Antiplatelet agents     Psychiatric/Behavioral:        No Depression,Anxiety         No past medical history pertinent negatives.        No anesthesia, bleeding, cardiac problems , PONV with previous surgeries/procedures.  Medications and Allergies reviewed in epic.   FH- No anesthesia,bleeding / venous thrombosis ,  in family   Heart disease in family - Brother at 51 Y, Mother at 46   Lives with wife ,who can help     Physical Exam      Physical Exam  Constitutional- General appearance-Conscious,Coherent  Eyes- No conjunctival icterus,pupils  round ,  reactive to light  and  bilateral intra ocular lenses  ENT-Oral cavity- moist ,  upper denture and lower denture  , Hearing grossly normal   Neck- No thyromegaly ,Trachea -central, No jugular venous distension,   No Carotid Bruit   Cardiovascular -Heart Sounds- Normal ,  no murmur  and  occasional irregularity suggestive of ectopy   , No gallop rhythm   Respiratory - Normal Respiratory Effort, Normal breath sounds, crepitations bases,  Crepitationsleft mid - posterior ,  no wheeze  and  no forced expiratory wheeze    Peripheral pitting pedal edema-- none , no calf pain   Gastrointestinal -Soft abdomen, No palpable masses, Non Tender,Liver,Spleen not palpable. No-- free fluid and shifting dullness  Musculoskeletal- No finger Clubbing. Strength grossly normal   Lymphatic-No Palpable cervical, axillary,Inguinal lymphadenopathy   Psychiatric - normal effect,Orientation  Rt Dorsalis pedis pulses-palpable    Lt Dorsalis pedis pulses- palpable   Rt Posterior tibial pulses -palpable   Left posterior tibial pulses -palpable   Miscellaneous -  no renal bruit,  bowel sounds positive  and osteoarthritic nodes   Investigations  [facility] and [facility]    Review of Medicine tests    April 2015     CONCLUSIONS   There is 20 - 39% right Internal Carotid stenosis.  There is 20 - 39% left Internal Carotid stenosis.    EKG- I had independently reviewed the EKG from--2/4/2019   It was reported to be showing     Sinus rhythm with occasional Premature ventricular complexes  Left atrial abnormality/enlargement  Right bundle branch block  Abnormal ECG  When compared with ECG of 09-FEB-2015 10:53,  Premature ventricular complexes Now present  Right bundle branch block has replaced Incomplete right bundle branch block    Review of clinical lab tests:  Lab Results   Component Value Date    CREATININE 1.2 02/04/2019    HGB 15.3 02/04/2019     02/04/2019       Review of old records- Was done and information gathered  regards to events leading to surgery and health conditions of significance in the perioperative period.    Outpatient Subjective & Objective

## 2019-02-04 NOTE — H&P
CC: Right knee pain    Mayur Roblero is a 84 y.o. male with 2 year history of Right knee pain. Pain is worse with activity and weight bearing.  Patient has experienced interference of activities of daily living due to decreased range of motion and an increase in joint pain and swelling.  Patient has failed non-operative treatment including NSAIDs, corticosteroid injections, viscosupplement injections, and activity modification.  Mayur Roblero currently ambulates independently. Had L TKA by Dr. [facility] in 2011.     Relevant medical conditions of significance in perioperative period:  CAD: s/p stents in 2008, on ASA and Plavix  COPD: uses Pulmicort bid      Past Medical History:   Diagnosis Date    Bladder cancer     Coronary artery disease     Emphysema of lung     Hyperlipidemia     Pneumonia     Pulmonary nodule        Past Surgical History:   Procedure Laterality Date    CARDIAC CATHETERIZATION  10/28/2008    rca stent       Family History   Problem Relation Age of Onset    Heart attack Mother     Heart disease Mother     Hypertension Father     Heart disease Brother     Heart attack Brother     Asthma Neg Hx     Emphysema Neg Hx        Review of patient's allergies indicates:   Allergen Reactions    Dilaudid [hydromorphone] Nausea And Vomiting    Keflex [cephalexin] Hives    Morphine Anaphylaxis    Moxifloxacin Other (See Comments)     Fever  Fever    Tiotropium bromide Anaphylaxis    Codeine     Ondansetron          Current Outpatient Medications:     ALPHAGAN P 0.1 % Drop, , Disp: , Rfl: 0    ARIPiprazole (ABILIFY) 2 MG Tab, TAKE 2 TABLETS EVERY DAY, Disp: 180 tablet, Rfl: 2    ARIPiprazole (ABILIFY) 2 MG Tab, Take 2 mg by mouth., Disp: , Rfl:     aspirin (ECOTRIN) 81 MG EC tablet, Take 81 mg by mouth once daily.  , Disp: , Rfl:     aspirin 81 MG Chew, Take 81 mg by mouth., Disp: , Rfl:     budesonide 180mcg (PULMICORT FLEXHALER) 180 mcg/actuation AePB, Inhale 2 puffs into the lungs 2  (two) times daily., Disp: 1 each, Rfl: 5    budesonide 180mcg (PULMICORT FLEXHALER) 180 mcg/actuation AePB, Inhale 2 puffs into the lungs., Disp: , Rfl:     clopidogrel (PLAVIX) 75 mg tablet, Take 75 mg by mouth once daily., Disp: , Rfl:     CRESTOR 10 mg tablet, TAKE 1 TABLET BY MOUTH EVERY DAY, Disp: 30 tablet, Rfl: 6    CRESTOR 10 mg tablet, TAKE 1 TABLET BY MOUTH EVERY DAY, Disp: 30 tablet, Rfl: 6    duloxetine (CYMBALTA) 30 MG capsule, Take 1 capsule (30 mg total) by mouth once daily., Disp: 30 capsule, Rfl: 11    levalbuterol (XOPENEX HFA) 45 mcg/actuation inhaler, Inhale 2 puffs into the lungs every 6 (six) hours as needed for Wheezing or Shortness of Breath., Disp: 1 Inhaler, Rfl: 4    lisinopril (PRINIVIL,ZESTRIL) 5 MG tablet, Take 5 mg by mouth once daily., Disp: , Rfl: 4    meclizine (ANTIVERT) 25 mg tablet, Take 25 mg by mouth 3 (three) times daily as needed., Disp: , Rfl:     OMEGA-3S/DHA/EPA/FISH OIL (OMEGA 3 ORAL), Take by mouth once daily., Disp: , Rfl:     prednisoLONE acetate (PRED FORTE) 1 % DrpS, , Disp: , Rfl: 0    predniSONE (DELTASONE) 10 MG tablet, Take 3 tabs per day x 5 days, then 2 tabs per day x 5 days, then 1 tab per day x 5 days, then stop. Take with food, Disp: 30 tablet, Rfl: 1    PULMICORT FLEXHALER 180 mcg/actuation AePB, USE 2 PUFFS 2 TIMES DAILY AS DIRECTED, Disp: 1 each, Rfl: 10    rosuvastatin (CRESTOR) 10 MG tablet, Take 1 tablet (10 mg total) by mouth once daily., Disp: 30 tablet, Rfl: 11    rosuvastatin (CRESTOR) 10 MG tablet, Take 10 mg by mouth., Disp: , Rfl:     valacyclovir (VALTREX) 500 MG tablet, Take 500 mg by mouth once daily., Disp: , Rfl:     vit C,E,Zn,Cu glu-om3-lut-clau (OCUVITE ADULT 50+) 250-5-1 mg Cap, Take by mouth once daily., Disp: , Rfl:     Review of Systems:  Constitutional: no fever or chills  Eyes: no visual changes  ENT: no nasal congestion or sore throat  Respiratory: no cough or shortness of breath  Cardiovascular: no chest pain or  "palpitations  Gastrointestinal: no nausea or vomiting, tolerating diet  Genitourinary: no hematuria or dysuria  Integument/Breast: no rash or pruritis  Hematologic/Lymphatic: no easy bruising or lymphadenopathy  Musculoskeletal: positive for knee pain  Neurological: no seizures or tremors  Behavioral/Psych: no auditory or visual hallucinations  Endocrine: no heat or cold intolerance    PE:  Ht 5' 9" (1.753 m)   Wt 89.5 kg (197 lb 6.8 oz)   BMI 29.15 kg/m²   General: Pleasant, cooperative, NAD   Gait: antalgic  HEENT: NCAT, sclera nonicteric   Lungs: Respirations clear bilaterally; equal and unlabored.   CV: S1S2; 2+ bilateral upper and lower extremity pulses.   Skin: Intact throughout with no rashes, erythema, or lesions  Extremities: No LE edema,  no erythema or warmth of the skin in either lower extremity.    Right knee exam:  Knee Range of Motion:0-120 active, pain with passive range of motion  Effusion:none  Condition of skin:intact  Location of tenderness:Medial joint line   Strength:4 of 5 quadriceps strength and 4 of 5 hamstring strength  Stability: stable to testing    Hip Examination:full painless range of motion, without tenderness    Radiographs: Radiographs reveal advanced degenerative changes including subchondral cyst formation, subchondral sclerosis, osteophyte formation, joint space narrowing.     Knee Alignment:  Moderate varus    Diagnosis: osteoarthritis Right knee    Plan: Right total knee arthroplasty    Due to the serious nature of total joint infection and the high prevalence of community acquired MRSA, vancomycin will be used perioperatively.            "

## 2019-02-04 NOTE — PROGRESS NOTES
[facility] - Pre Op Consult  Progress Note    Patient Name: Mayur Roblero  MRN: [id number]  Date of Evaluation- 02/04/2019  PCP- Primary Doctor No    Future cases for Mayur Roblero [[id number]]     Case ID Status Date Time [place] Procedure Provider Location    [id number] [facility] 2/15/2019  7:00  REPLACEMENT-KNEE-TOTAL [facility] MD [[id number]] [facility] OR 2ND FLR      Rt     HPI:  History of present illness- I had the pleasure of meeting this pleasant 84 y.o. gentleman in the pre op clinic prior to his elective Orthopedic surgery. The patient is new to me . Mayur was accompanied by wife Alissa.    I have obtained the history by speaking to the patient and by reviewing the electronic health records.    Events leading up to surgery / History of presenting illness -    He has been troubled with moderate-severe  Rt knee pain for a long time ( 2 years )  . Pain increases with activity and decreases with resting.    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 ) , rakes the leaves, can take 1 flight of  stairs   Gets exertional SOB   Went up 2 flights of stairs when went with wife to see her Cardiologist     Health conditions of significance for the perioperative period CAD, stenting , COPD            Subjective/ Objective:          Chief complaint-Preoperative evaluation, Perioperative Medical management, complication reduction plan     Active cardiac conditions- none    Revised cardiac risk index predictors- coronary artery disease    Functional capacity -Examples of physical activity  house work and can take 1 flight of stairs----- He can undertake all the above activities without  chest pain,chest tightness,,dizziness,lightheadedness making his exercise tolerance more, than 4 Mets.   Winded on exertion, not new ,stable over time     Review of Systems    Constitutional: Negative for chills and fever.        No unusual weight changes     HENT:        STOPBANG score 4 / 8        Elevated BP  Age over 50 years  Neck size over 40 CM  Male gender   Eyes:        No unusual vision changes- None    Respiratory:        No cough , phlegm  Lisinopril caused cough - was chnaged  No Hemoptysis   Cardiovascular:        As noted   Gastrointestinal:        Bowels- Regular , every other day , once in 2-3 days   uptodate  with Colonoscopy per wife   No overt GI/ blood losses   Endocrine:        Prednisone use > 20 mg daily for 3 weeks- none   Genitourinary: Negative for dysuria.        No urinary hesitancy    Musculoskeletal:        As above      Skin: Negative for rash.   Neurological: Negative for syncope.        No unilateral weakness   Hematological:        ASA,plavix  Antiplatelet agents     Psychiatric/Behavioral:        No Depression,Anxiety         No past medical history pertinent negatives.        No anesthesia, bleeding, cardiac problems , PONV with previous surgeries/procedures.  Medications and Allergies reviewed in epic.   FH- No anesthesia,bleeding / venous thrombosis ,  in family   Heart disease in family - Brother at 51 Y, Mother at 46   Lives with wife ,who can help     Physical Exam      Physical Exam  Constitutional- General appearance-Conscious,Coherent  Eyes- No conjunctival icterus,pupils  round , reactive to light  and  bilateral intra ocular lenses  ENT-Oral cavity- moist ,  upper denture and lower denture  , Hearing grossly normal   Neck- No thyromegaly ,Trachea -central, No jugular venous distension,   No Carotid Bruit   Cardiovascular -Heart Sounds- Normal ,  no murmur  and  occasional irregularity suggestive of ectopy   , No gallop rhythm   Respiratory - Normal Respiratory Effort, Normal breath sounds, crepitations bases,  Crepitationsleft mid - posterior ,  no wheeze  and  no forced expiratory wheeze    Peripheral pitting pedal edema-- none , no calf  pain   Gastrointestinal -Soft abdomen, No palpable masses, Non Tender,Liver,Spleen not palpable. No-- free fluid and shifting dullness  Musculoskeletal- No finger Clubbing. Strength grossly normal   Lymphatic-No Palpable cervical, axillary,Inguinal lymphadenopathy   Psychiatric - normal effect,Orientation  Rt Dorsalis pedis pulses-palpable    Lt Dorsalis pedis pulses- palpable   Rt Posterior tibial pulses -palpable   Left posterior tibial pulses -palpable   Miscellaneous -  no renal bruit,  bowel sounds positive  and osteoarthritic nodes   Investigations  Lab and Imaging have been reviewed in [facility].    Review of Medicine tests    April 2015     CONCLUSIONS   There is 20 - 39% right Internal Carotid stenosis.  There is 20 - 39% left Internal Carotid stenosis.    EKG- I had independently reviewed the EKG from--2/4/2019   It was reported to be showing     Sinus rhythm with occasional Premature ventricular complexes  Left atrial abnormality/enlargement  Right bundle branch block  Abnormal ECG  When compared with ECG of 09-FEB-2015 10:53,  Premature ventricular complexes Now present  Right bundle branch block has replaced Incomplete right bundle branch block    Review of clinical lab tests:  Lab Results   Component Value Date    CREATININE 1.2 02/04/2019    HGB 15.3 02/04/2019     02/04/2019       Review of old records- Was done and information gathered regards to events leading to surgery and health conditions of significance in the perioperative period.        Preoperative cardiac risk assessment-  The patient does not have any active cardiac conditions . Revised cardiac risk index predictors-1 ---.Functional capacity is more than 4 Mets. He will be undergoing a Orthopedic procedure that carries a intermediate risk     The estimated risk of the rate of adverse cardiac outcomes  0.9%    No further cardiac work up is indicated prior to proceeding with the surgery          American Society of Anesthesiologists  Physical status classification ( ASA ) class: 3     Postoperative pulmonary complication risk assessment:      ARISCAT ( Canet) risk index- risk class -  Low, if duration of surgery is under 2 hours, intermediate, if duration of surgery is 2-3 hours , higher , if over 3 hours       Assessment/Plan:     CAD (coronary artery disease)  H/o CAD -> cath with 1 V CAD: ostial and mid RCA, stented 08 with BMS  Plavix management(okay to hold 7 days before sx per Dr. Del Toro, cardiology)        Tremor  No longer a problem since been on Abilify     Orthostatic dizziness  Gets dizzy on position changes   Suggested to change positions gradually and to stay hydrated     Shortness of breath  Stable   Stress test 2015   The perfusion scan is free of evidence for myocardial ischemia or injury.   The ventricular volumes are normal at rest and stress.   Pulmonology evaluation on 2/6       Bladder cancer  In 2008     HBP (high blood pressure)  Was commenced on BP Medication Jan 31 st , 2019   Unclear name   Suggested calling with Bp Med name   Home BP readings -130/60's  Recent BP readings in the record-Fluctuating   Hypertension-  Blood pressure is acceptable . I suggest  blood pressure  monitoring .I suggest addressing pain control as uncontrolled pain can increased blood pressure     Bradycardia  No syncope       Hyperlipemia  HLD-I  suggest continuation of statin during the entire perioperative period.    PVCs (premature ventricular contractions)  Suggested coffee reduction   Drinks 5 cups of coffee a day     S/P coronary artery stent placement  2008 , around 2011, 2014 ,May 2016  Was SOB with first   Never had chest pain   Wife feels current SOB is unlike SOB leading to stenting  ASA - with history of  Stenting.  I have discussed the  risk of stent thrombosis with interruption of Aspirin regimen .Risk ( bleeding ) ,  benefits about perioperative use of  ASA  discussed       Emphysema/COPD  Pulmicort that is helping - rinsing mouth  after use discussed   No recent flare up    I suggest consideration of inhaled bronchodilator use if the patient has perioperative bronchospasm       CKD (chronic kidney disease) stage 3, GFR 30-59 ml/min  Base line Creatinine 1.2   Stages of CKD discussed  Deleterious effects NSAID's , Beneficial effects of Hydration discussed   Tylenol as needed for pain     I  suggest monitoring renal function, in put and out put status zoran-operatively. I  suggest avoiding nephrotoxic medication including NSAIDs, COX2 inhibitors, intravenous contrast agent,avoiding hypotension to prevent further renal impairment.           Preventive perioperative care    Thromboembolic prophylaxis:  His risk factors for thrombosis include surgical procedure and age.I suggest  thromboembolic prophylaxis ( mechanical/pharmacological, weighing the risk benefits of pharmacological agent use considering zoran procedural bleeding )  during the perioperative period.I suggested being active in the post operative period. The patient is a candidate for extended DVT prophylaxis     Postoperative pulmonary complication prophylaxis-Risk factors for post operative pulmonary complications include COPD  age over 65 years, ASA class >2 and COPD- I suggest incentive spirometry use, early ambulation and end tidal carbon dioxide monitoring  , oral care , head end of bed elevation      Renal complication prophylaxis-Risk factors for renal complications include pre-existing renal disease, hypertension and vascular disease . I suggest keeping him well hydrated and avoidance/ minimizing the use of  NSAID's,CURRY 2 Inhibitors ,IV contrast if possible in the perioperative period.I suggested drinking 2 litre's of water a day      Surgical site Infection Prophylaxis-I  suggest appropriate antibiotic for Prophylaxis against Surgical site infections     Delirium prophylaxis-Risk factors - Advanced Age - I suggest avoidance / minimizing the use of  Benzodiazepines ( unless the  patient has been taking it on a regular basis ),Anticholinergic medication,Antihistamines ( like  Benadryl).I suggest minimizing the use of opioid medication and use of IV tylenol,if it is appropriate. I suggest using the lowest possible dose of opioids for the shortest duration possible in the perioperative period. I suggest to Keep shades/blinds open during the day, lights off and shades closed at night to encourage normal sleep/wake cycle.I encourage the presence of the family member with the patient at all times, if at all possible as mental status changes can be picked up early by the family members and they help with reorientation. I encouraged the presence of family to help with orientation in the perioperative period. Benadryl avoidance suggested      In view of regional aneathesaia  the patient  is at risk of postoperative urinary retention.  I suggest avoidance / minimizing the of  Benzodiazepines,Anticholinergic medication,antihistamines ( Benadryl) , if possible in the perioperative period. I suggest using the minimum possible use of opioids for the minimum period of time in the perioperative period. Benadryl avoidance suggested      This visit was focused on Preoperative evaluation, Perioperative Medical management, complication reduction plans. I suggest that the patient follows up with primary care or relevant sub specialists for ongoing health care.    I appreciate the opportunity to be involved in this patients care. Please feel free to contact me if there were any questions about this consultation.    Patient is optimized     Patient was instructed to call and update me about any changes to health,  medication, office visits ,testing out side of the [facility] operative care center , hospitalizations between now and surgery     Radha Rebolledo MD  Perioperative Medicine  [facility]   Pager [phone]    UA- no UTI   Proteinuria - suggested follow up   ---  2/4- 18 34     /64    Pulse 99  Temp- 97.3   Sat - 95 %   ----  2/8- 18 25      [facility]   Cardiology evaluation 1/24/2019   BP Medication   Azilsartan, , Chlorthalidone   ---  2/14- 19 03     Had pulmonary evaluation on 2/6   Mild obstruction with preserved FEV1 stable for years.  Patient is clinically stable.    Called to follow up , to address any concerns with the up coming surgery or any questions on Medication instructions   Unable to speak   Unable to leave a message

## 2019-02-04 NOTE — ASSESSMENT & PLAN NOTE
2008 , around 2011, 2014 ,May 2016  Was SOB with first   Never had chest pain   Wife feels current SOB is unlike SOB leading to stenting  ASA - with history of  Stenting.  I have discussed the  risk of stent thrombosis with interruption of Aspirin regimen .Risk ( bleeding ) ,  benefits about perioperative use of  ASA  discussed

## 2019-02-04 NOTE — ASSESSMENT & PLAN NOTE
Pulmicort that is helping - rinsing mouth after use discussed   No recent flare up    I suggest consideration of inhaled bronchodilator use if the patient has perioperative bronchospasm

## 2019-02-04 NOTE — DISCHARGE INSTRUCTIONS
Your surgery has been scheduled for:__________________________________________    You should report to:  ____[facility], located on the [place] side of the first floor of the           [facility] ([phone])  ____[facility], located on the [facility] side of the            Second floor of the [facility] ([phone])  ____3rd Floor [facility] located on the [facility] side of the [facility] [phone]  Please Note   - Tell your doctor if you take Aspirin, products containing Aspirin, herbal medications  or blood thinners, such as Coumadin, Ticlid, or Plavix.  (Consult your provider regarding holding or stopping before surgery).  - Arrange for someone to drive you home following surgery.  You will not be allowed to leave the surgical facility alone or drive yourself home following sedation and anesthesia.  Before Surgery  - Stop taking all herbal medications 14days prior to surgery  - No Motrin/Advil (Ibuprofen) 7 days before surgery  - No Aleve (Naproxen) 7 days before surgery  - No Goody's/BC powder 7 days before surgery  - Stop Taking Asprin, products containing Asprin _____days before surgery  - Stop taking blood thinners_______days before surgery  - Refrain from drinking alcoholic beverages for 24hours before and after surgery  - Stop or limit smoking _________days before surgery  - You may take Tylenol for pain  Night before Surgery  - Take a shower or bath (shower is recommended).  Bathe with Hibiclens soap or an antibacterial soap from the neck down.  If not supplied by your surgeon, hibiclens soap will need to be purchased over the counter in pharmacy.  Rinse soap off thoroughly.  - Shampoo your hair with your regular shampoo                           Food and Beverage Instructions  1. Stop ALL solid food, gum, candy (including vitamins) 8 hours before surgery/procedure time.  2. The patient should be  ENCOURAGED to drink carbohydrate-rich clear liquids (sports drinks, clear juices) until 2 hours prior to surgery/procedure time.  3. CLEAR liquids include only water, black coffee NO creamer, clear oral rehydration drinks, clear sports drinks or clear fruit juices (no orange juice, no pulpy juices, no apple cider). Advise patients if they can read newsprint through the liquid, it qualifies as clear liquid.   4. IF IN DOUBT, drink water instead.   5. NOTHING  TO DRINK 2 hours before to arrival for surgery/procedure time. If you are told to take medication on the morning of surgery, it may be taken with a sip of water.     FOLLOW YOUR SURGEON'S INSTRUCTIONS REGARDING FOOD AND BEVERAGES IF DIFFERENT THAN ABOVE INSTRUCTIONS.    The Day of Surgery  - Take another bath or shower with hibiclens or any antibacterial soap, to reduce the chance of infection.  - Take heart and blood pressure medications with a small sip of water, as advised by the perioperative team.  - Do not take fluid pills  - You may brush your teeth and rinse your mouth, but do not swall any additional water.   - Do not apply perfumes, powder, body lotions or deodorant on the day of surgery.  - Nail polish should be removed.  - Do not wear makeup or moisturizer  - Wear comfortable clothes, such as a button front shirt and loose fitting pants.  - Leave all jewelry, including body piercings, and valuables at home.    - Bring any devices you will neeed after surgery such as crutches or canes.  - If you have sleep apnea, please bring your CPAP machine  In the event that your physical condition changes including the onset of a cold or respiratory illness, or if you have to delay or cancel your surgery, please notify your surgeon.    Anesthesia: Regional Anesthesia    Youre scheduled for surgery. During surgery, youll receive medicine called anesthesia to keep you comfortable and pain-free. Your surgeon has decided that youll receive regional anesthesia.  This sheet tells you what to expect with this type of anesthesia.  What is regional anesthesia?  Regional anesthesia numbs one region of your body. The anesthesia may be given around nerves or into veins in your arms, neck, or legs (nerve block or [place] block). Or it may be sent into the spinal fluid (spinal anesthesia) or into the space just outside the spinal fluid (epidural anesthesia). You may also be given sedatives to help you relax.  Nerve block or [place] block  A small area of the body, such as an arm or leg, can be numbed using a nerve block or [place] block.  · Nerve block. During a nerve block, your skin is numbed. A needle is then inserted near nerves that serve the area to be numbed. Anesthetic is sent through the needle.  · IV regional or [place] block. For this type of block, an IV line is put into a vein. The blood flow to the area to be numbed is blocked for a short time. Anesthetic is sent through the IV.  Spinal anesthesia  Spinal anesthesia numbs your body from about the waist down.  · Anesthetic is injected into the spinal fluid. This is a substance that surrounds the spinal cord in your spinal column. The anesthetic blocks pain traveling from the body to the brain.  · To receive the anesthetic, your skin is numbed at the injection site on your back.  · A needle is then inserted into the spinal space. Anesthetic is sent into the spinal fluid through the needle.  Epidural anesthesia  Epidural anesthesia is most commonly used during childbirth and may also be used after surgical procedures of the chest, belly, and legs.  · Anesthetic is injected into the epidural space. This is just outside the dural sac which contains the spinal fluid.  · To receive the anesthetic, your skin is numbed at the injection site on your back.  · A needle is then inserted into the epidural space. Anesthetic is sent into the epidural space through the needle.  · A small flexible catheter may be attached to the needle and left in  place. This allows for continuous injections or infusions of anesthetic.  Anesthesia tools and medicines that might be near you during your procedure  · Local anesthetic. This medicine is given through a needle numbs one region of your body.  · Electrocardiography leads (electrodes). These are used to record your heart rate and rhythm.  · Blood pressure cuff. A cuff is placed on your arm to keep track of your blood pressure.  · Pulse oximeter. This small clip is placed on the end of the finger. It measures your blood oxygen level.  · Sedatives. These medicines may be given through an IV. They help to relax you and keep you comfortable. You may stay awake or sleep lightly.  · Oxygen. You may be given oxygen through a facemask.  Risks and possible complications  Regional anesthesia carries some risks. These include:  · Nausea and vomiting  · Headache  · Backache  · Decreased blood pressure  · Allergic reaction to the anesthetic  · Ongoing numbness (rare)  · Irregular heartbeat (rare)  · Cardiac arrest (rare)   Date Last Reviewed: 12/1/2016  © 1219-9929 [employer]. [address] [place] [address]. All rights reserved. This information is not intended as a substitute for professional medical care. Always follow your healthcare professional's instructions.

## 2019-02-04 NOTE — HPI
History of present illness- I had the pleasure of meeting this pleasant 84 y.o. gentleman in the pre op clinic prior to his elective Orthopedic surgery. The patient is new to me . Mayur was accompanied by wife Alissa.    I have obtained the history by speaking to the patient and by reviewing the electronic health records.    Events leading up to surgery / History of presenting illness -    He has been troubled with moderate-severe  Rt knee pain for a long time ( 2 years )  . Pain increases with activity and decreases with resting.    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

## 2019-02-04 NOTE — ASSESSMENT & PLAN NOTE
H/o CAD -> cath with 1 V CAD: ostial and mid RCA, stented 08 with BMS  Plavix management(okay to hold 7 days before sx per Dr. Del Toro, cardiology)

## 2019-02-05 ENCOUNTER — PATIENT MESSAGE (OUTPATIENT)
Dept: SURGERY | Facility: HOSPITAL | Age: 84
End: 2019-02-05

## 2019-02-05 ENCOUNTER — PATIENT MESSAGE (OUTPATIENT)
Dept: INTERNAL MEDICINE | Facility: CLINIC | Age: 84
End: 2019-02-05

## 2019-02-06 ENCOUNTER — OFFICE VISIT (OUTPATIENT)
Dept: PULMONOLOGY | Facility: CLINIC | Age: 84
End: 2019-02-06
Payer: MEDICARE

## 2019-02-06 ENCOUNTER — HOSPITAL ENCOUNTER (OUTPATIENT)
Dept: PULMONOLOGY | Facility: CLINIC | Age: 84
Discharge: HOME | End: 2019-02-06
Payer: MEDICARE

## 2019-02-06 VITALS
SYSTOLIC BLOOD PRESSURE: 110 MMHG | BODY MASS INDEX: 28.2 KG/M2 | OXYGEN SATURATION: 95 % | DIASTOLIC BLOOD PRESSURE: 72 MMHG | HEART RATE: 72 BPM | HEIGHT: 70 IN | WEIGHT: 197 LBS

## 2019-02-06 DIAGNOSIS — J44.9 CHRONIC OBSTRUCTIVE PULMONARY DISEASE, UNSPECIFIED COPD TYPE: ICD-10-CM

## 2019-02-06 DIAGNOSIS — J43.2 CENTRILOBULAR EMPHYSEMA: ICD-10-CM

## 2019-02-06 DIAGNOSIS — Z01.811 PREOP PULMONARY/RESPIRATORY EXAM: Primary | ICD-10-CM

## 2019-02-06 LAB
PRE FEV1 FVC: 68
PRE FEV1: 2.68
PRE FVC: 3.97
PREDICTED FEV1 FVC: 77
PREDICTED FEV1: 2.76
PREDICTED FVC: 3.55

## 2019-02-06 PROCEDURE — 99213 OFFICE O/P EST LOW 20 MIN: CPT | Mod: 25,S$PBB,, | Performed by: INTERNAL MEDICINE

## 2019-02-06 PROCEDURE — 99999 PR PBB SHADOW E&M-EST. PATIENT-LVL III: CPT | Mod: PBBFAC,,, | Performed by: INTERNAL MEDICINE

## 2019-02-06 PROCEDURE — 99999 PR PBB SHADOW E&M-EST. PATIENT-LVL III: ICD-10-PCS | Mod: PBBFAC,,, | Performed by: INTERNAL MEDICINE

## 2019-02-06 PROCEDURE — 94010 BREATHING CAPACITY TEST: CPT | Mod: PBBFAC | Performed by: INTERNAL MEDICINE

## 2019-02-06 PROCEDURE — 99213 OFFICE O/P EST LOW 20 MIN: CPT | Mod: PBBFAC,25 | Performed by: INTERNAL MEDICINE

## 2019-02-06 PROCEDURE — 94729 DIFFUSING CAPACITY: CPT | Mod: PBBFAC | Performed by: INTERNAL MEDICINE

## 2019-02-06 PROCEDURE — 94729 DIFFUSING CAPACITY: CPT | Mod: 26,S$PBB,, | Performed by: INTERNAL MEDICINE

## 2019-02-06 PROCEDURE — 94729 PR C02/MEMBANE DIFFUSE CAPACITY: ICD-10-PCS | Mod: 26,S$PBB,, | Performed by: INTERNAL MEDICINE

## 2019-02-06 PROCEDURE — 99213 PR OFFICE/OUTPT VISIT, EST, LEVL III, 20-29 MIN: ICD-10-PCS | Mod: 25,S$PBB,, | Performed by: INTERNAL MEDICINE

## 2019-02-06 PROCEDURE — 94010 BREATHING CAPACITY TEST: ICD-10-PCS | Mod: 26,S$PBB,, | Performed by: INTERNAL MEDICINE

## 2019-02-06 PROCEDURE — 94010 BREATHING CAPACITY TEST: CPT | Mod: 26,S$PBB,, | Performed by: INTERNAL MEDICINE

## 2019-02-06 NOTE — PROGRESS NOTES
Subjective:       Patient ID: Mayur Roblero is a 84 y.o. male.    Chief Complaint: Emphysema    84 year old with a history of COPD/Emphysema.  Here for preoperative pulmonary clearance.  Previously had anaphylaxis to spiriva daily and had also had an adverse reaction.  Feels that respiratory symptoms are stable.  Pulmicort twice daily and rarely uses xopenex.      Here for preoperative respiratory assessment prior to TKR on the left.  No personal or family history of anesthesia or post op respiratory complication.  Review of Systems    Objective:       Vitals:    02/06/19 1427   BP: 110/72   Pulse: 72       Physical Exam   Constitutional: He is oriented to person, place, and time. He appears well-developed and well-nourished.   HENT:   Head: Normocephalic.   Cardiovascular: Normal rate and regular rhythm.   Pulmonary/Chest: He has no wheezes. He has no rales.   Musculoskeletal: Normal range of motion. He exhibits no edema.   Lymphadenopathy: No supraclavicular adenopathy is present.     He has no cervical adenopathy.   Neurological: He is alert and oriented to person, place, and time.   Psychiatric: He has a normal mood and affect.     Personal Diagnostic Review  Pulmonary function tests: FEV1: 2.68  (98 % predicted), FVC:  3.97 (112 % predicted), FEV1/FVC:  68, DLCO: 14.4 (64 % predicted), Mild obstruction with preserved FEV1 and mildly decreased diffusion.    PFTs are stable from today to previous when personally compared in 2015  No flowsheet data found.      Assessment:       1. Preop pulmonary/respiratory exam    2. Centrilobular emphysema        Outpatient Encounter Medications as of 2/6/2019   Medication Sig Dispense Refill    ARIPiprazole (ABILIFY) 2 MG Tab TAKE 2 TABLETS EVERY  tablet 2    aspirin (ECOTRIN) 81 MG EC tablet Take 81 mg by mouth once daily.        azilsartan medoxomil (EDARBI) 40 mg Tab Take 20 mg by mouth once daily.       budesonide 180mcg (PULMICORT FLEXHALER) 180 mcg/actuation  AePB Inhale 2 puffs into the lungs 2 (two) times daily. 1 each 5    clopidogrel (PLAVIX) 75 mg tablet Take 75 mg by mouth once daily.      CRESTOR 10 mg tablet TAKE 1 TABLET BY MOUTH EVERY DAY 30 tablet 6    CRESTOR 10 mg tablet TAKE 1 TABLET BY MOUTH EVERY DAY 30 tablet 6    levalbuterol (XOPENEX HFA) 45 mcg/actuation inhaler Inhale 2 puffs into the lungs every 6 (six) hours as needed for Wheezing or Shortness of Breath. 1 Inhaler 4    OMEGA-3S/DHA/EPA/FISH OIL (OMEGA 3 ORAL) Take 1,000 mg by mouth once daily.       PULMICORT FLEXHALER 180 mcg/actuation AePB USE 2 PUFFS 2 TIMES DAILY AS DIRECTED 1 each 10    vit A/vit C/vit E/zinc/copper (PRESERVISION AREDS ORAL) Take by mouth.      duloxetine (CYMBALTA) 30 MG capsule Take 1 capsule (30 mg total) by mouth once daily. 30 capsule 11     No facility-administered encounter medications on file as of 2/6/2019.      No orders of the defined types were placed in this encounter.    Plan:     Problem List Items Addressed This Visit     Emphysema/COPD    Overview     Mild obstruction with preserved FEV1 stable for years.  Anaphylaxis to LABA/LAMA  Only uses ICS.    Continue pulmicort for now, will change formulary when wife contacts us.             Other Visit Diagnoses     Preop pulmonary/respiratory exam    -  Primary    Patient is clinically stable.          Low risk for post operative respiratory complications with usual post op care including early ambulation, minimizing narcotics and frequent use of incentive spirometry

## 2019-02-08 NOTE — PRE-PROCEDURE INSTRUCTIONS
Patient called to f/u new b/p medication and instructions.  Wife confirmed, patient is taking Edarbi 20 once every am.  Given instructions to wife and patient to hold the medication the morning of surgery. Wife was able to repeat the instructions.

## 2019-02-14 ENCOUNTER — TELEPHONE (OUTPATIENT)
Dept: ORTHOPEDICS | Facility: CLINIC | Age: 84
End: 2019-02-14

## 2019-02-14 NOTE — TELEPHONE ENCOUNTER
we spoke : Reminded to eat light the night before surgery, NPO after midnight, take hibclens shower the night before surgery  & again in the am , sleep on clean sheets  in clean clothes, no laxatives or stool softeners , report to the 2nd floor surgery unit for 7 am   She voiced understanding

## 2019-02-15 ENCOUNTER — HOSPITAL ENCOUNTER (INPATIENT)
Facility: HOSPITAL | Age: 84
LOS: 2 days | Discharge: HOME HEALTH | DRG: 470 | End: 2019-02-17
Attending: ORTHOPAEDIC SURGERY | Admitting: ORTHOPAEDIC SURGERY
Payer: MEDICARE

## 2019-02-15 ENCOUNTER — TELEPHONE (OUTPATIENT)
Dept: PHARMACY | Facility: CLINIC | Age: 84
End: 2019-02-15

## 2019-02-15 ENCOUNTER — ANESTHESIA (OUTPATIENT)
Dept: SURGERY | Facility: HOSPITAL | Age: 84
DRG: 470 | End: 2019-02-15
Payer: MEDICARE

## 2019-02-15 DIAGNOSIS — M17.11 PRIMARY OSTEOARTHRITIS OF RIGHT KNEE: ICD-10-CM

## 2019-02-15 LAB
ANION GAP SERPL CALC-SCNC: 7 MMOL/L
BUN SERPL-MCNC: 11 MG/DL
CALCIUM SERPL-MCNC: 8.3 MG/DL
CHLORIDE SERPL-SCNC: 104 MMOL/L
CO2 SERPL-SCNC: 22 MMOL/L
CREAT SERPL-MCNC: 1.2 MG/DL
EST. GFR  (AFRICAN AMERICAN): >60 ML/MIN/1.73 M^2
EST. GFR  (NON AFRICAN AMERICAN): 56 ML/MIN/1.73 M^2
GLUCOSE SERPL-MCNC: 127 MG/DL
INR PPP: 1.2
POTASSIUM SERPL-SCNC: 4.2 MMOL/L
PROTHROMBIN TIME: 11.7 SEC
SODIUM SERPL-SCNC: 133 MMOL/L

## 2019-02-15 PROCEDURE — 25000003 PHARM REV CODE 250: Performed by: UROLOGY

## 2019-02-15 PROCEDURE — 64450 NJX AA&/STRD OTHER PN/BRANCH: CPT | Mod: 59,RT,GC, | Performed by: ANESTHESIOLOGY

## 2019-02-15 PROCEDURE — D9220A PRA ANESTHESIA: Mod: ANES,,, | Performed by: ANESTHESIOLOGY

## 2019-02-15 PROCEDURE — 94664 DEMO&/EVAL PT USE INHALER: CPT

## 2019-02-15 PROCEDURE — 76942 ECHO GUIDE FOR BIOPSY: CPT | Performed by: ANESTHESIOLOGY

## 2019-02-15 PROCEDURE — 85610 PROTHROMBIN TIME: CPT

## 2019-02-15 PROCEDURE — 63600175 PHARM REV CODE 636 W HCPCS: Performed by: ANESTHESIOLOGY

## 2019-02-15 PROCEDURE — 37000009 HC ANESTHESIA EA ADD 15 MINS: Performed by: ORTHOPAEDIC SURGERY

## 2019-02-15 PROCEDURE — 88305 TISSUE SPECIMEN TO PATHOLOGY - SURGERY: ICD-10-PCS | Mod: 26,,, | Performed by: PATHOLOGY

## 2019-02-15 PROCEDURE — 88311 TISSUE SPECIMEN TO PATHOLOGY - SURGERY: ICD-10-PCS | Mod: 26,,, | Performed by: PATHOLOGY

## 2019-02-15 PROCEDURE — 63600175 PHARM REV CODE 636 W HCPCS: Performed by: PHYSICIAN ASSISTANT

## 2019-02-15 PROCEDURE — 63600175 PHARM REV CODE 636 W HCPCS: Performed by: NURSE ANESTHETIST, CERTIFIED REGISTERED

## 2019-02-15 PROCEDURE — D9220A PRA ANESTHESIA: ICD-10-PCS | Mod: ANES,,, | Performed by: ANESTHESIOLOGY

## 2019-02-15 PROCEDURE — 76942 ECHO GUIDE FOR BIOPSY: CPT | Mod: 26,GC,, | Performed by: ANESTHESIOLOGY

## 2019-02-15 PROCEDURE — 25000242 PHARM REV CODE 250 ALT 637 W/ HCPCS: Performed by: UROLOGY

## 2019-02-15 PROCEDURE — 88305 TISSUE EXAM BY PATHOLOGIST: CPT | Performed by: PATHOLOGY

## 2019-02-15 PROCEDURE — 25000003 PHARM REV CODE 250: Performed by: PHYSICIAN ASSISTANT

## 2019-02-15 PROCEDURE — 36000710: Performed by: ORTHOPAEDIC SURGERY

## 2019-02-15 PROCEDURE — 94799 UNLISTED PULMONARY SVC/PX: CPT

## 2019-02-15 PROCEDURE — 80048 BASIC METABOLIC PNL TOTAL CA: CPT

## 2019-02-15 PROCEDURE — 25000003 PHARM REV CODE 250

## 2019-02-15 PROCEDURE — 88311 DECALCIFY TISSUE: CPT | Mod: 26,,, | Performed by: PATHOLOGY

## 2019-02-15 PROCEDURE — 27000221 HC OXYGEN, UP TO 24 HOURS

## 2019-02-15 PROCEDURE — C1776 JOINT DEVICE (IMPLANTABLE): HCPCS | Performed by: ORTHOPAEDIC SURGERY

## 2019-02-15 PROCEDURE — 27201423 OPTIME MED/SURG SUP & DEVICES STERILE SUPPLY: Performed by: ORTHOPAEDIC SURGERY

## 2019-02-15 PROCEDURE — 97165 OT EVAL LOW COMPLEX 30 MIN: CPT

## 2019-02-15 PROCEDURE — 64448 NJX AA&/STRD FEM NRV NFS IMG: CPT | Performed by: ANESTHESIOLOGY

## 2019-02-15 PROCEDURE — 25000003 PHARM REV CODE 250: Performed by: NURSE ANESTHETIST, CERTIFIED REGISTERED

## 2019-02-15 PROCEDURE — 94761 N-INVAS EAR/PLS OXIMETRY MLT: CPT

## 2019-02-15 PROCEDURE — 64448 RIGHT ADDUCTOR CANAL CATHETER: ICD-10-PCS | Mod: 59,RT,GC, | Performed by: ANESTHESIOLOGY

## 2019-02-15 PROCEDURE — 27447 PR TOTAL KNEE ARTHROPLASTY: ICD-10-PCS | Mod: RT,GC,, | Performed by: ORTHOPAEDIC SURGERY

## 2019-02-15 PROCEDURE — 36000711: Performed by: ORTHOPAEDIC SURGERY

## 2019-02-15 PROCEDURE — 71000039 HC RECOVERY, EACH ADD'L HOUR: Performed by: ORTHOPAEDIC SURGERY

## 2019-02-15 PROCEDURE — 27000646 HC AEROBIKA DEVICE

## 2019-02-15 PROCEDURE — 63600175 PHARM REV CODE 636 W HCPCS: Performed by: ORTHOPAEDIC SURGERY

## 2019-02-15 PROCEDURE — 97161 PT EVAL LOW COMPLEX 20 MIN: CPT

## 2019-02-15 PROCEDURE — 97530 THERAPEUTIC ACTIVITIES: CPT

## 2019-02-15 PROCEDURE — 37000008 HC ANESTHESIA 1ST 15 MINUTES: Performed by: ORTHOPAEDIC SURGERY

## 2019-02-15 PROCEDURE — D9220A PRA ANESTHESIA: Mod: CRNA,,, | Performed by: NURSE ANESTHETIST, CERTIFIED REGISTERED

## 2019-02-15 PROCEDURE — 88305 TISSUE EXAM BY PATHOLOGIST: CPT | Mod: 26,,, | Performed by: PATHOLOGY

## 2019-02-15 PROCEDURE — 36415 COLL VENOUS BLD VENIPUNCTURE: CPT

## 2019-02-15 PROCEDURE — 94640 AIRWAY INHALATION TREATMENT: CPT

## 2019-02-15 PROCEDURE — 64450: ICD-10-PCS | Mod: 59,RT,GC, | Performed by: ANESTHESIOLOGY

## 2019-02-15 PROCEDURE — 25000003 PHARM REV CODE 250: Performed by: STUDENT IN AN ORGANIZED HEALTH CARE EDUCATION/TRAINING PROGRAM

## 2019-02-15 PROCEDURE — 11000001 HC ACUTE MED/SURG PRIVATE ROOM

## 2019-02-15 PROCEDURE — 97116 GAIT TRAINING THERAPY: CPT | Mod: 59

## 2019-02-15 PROCEDURE — 99900035 HC TECH TIME PER 15 MIN (STAT)

## 2019-02-15 PROCEDURE — 99499 UNLISTED E&M SERVICE: CPT | Mod: ,,, | Performed by: ORTHOPAEDIC SURGERY

## 2019-02-15 PROCEDURE — 71000033 HC RECOVERY, INTIAL HOUR: Performed by: ORTHOPAEDIC SURGERY

## 2019-02-15 PROCEDURE — 76942 RIGHT ADDUCTOR CANAL CATHETER: ICD-10-PCS | Mod: 26,GC,, | Performed by: ANESTHESIOLOGY

## 2019-02-15 PROCEDURE — 64448 NJX AA&/STRD FEM NRV NFS IMG: CPT | Mod: 59,RT,GC, | Performed by: ANESTHESIOLOGY

## 2019-02-15 PROCEDURE — 63600175 PHARM REV CODE 636 W HCPCS

## 2019-02-15 PROCEDURE — 99499 NO LOS: ICD-10-PCS | Mod: ,,, | Performed by: ORTHOPAEDIC SURGERY

## 2019-02-15 PROCEDURE — C1713 ANCHOR/SCREW BN/BN,TIS/BN: HCPCS | Performed by: ORTHOPAEDIC SURGERY

## 2019-02-15 PROCEDURE — D9220A PRA ANESTHESIA: ICD-10-PCS | Mod: CRNA,,, | Performed by: NURSE ANESTHETIST, CERTIFIED REGISTERED

## 2019-02-15 PROCEDURE — 27447 TOTAL KNEE ARTHROPLASTY: CPT | Mod: RT,GC,, | Performed by: ORTHOPAEDIC SURGERY

## 2019-02-15 DEVICE — PLUG BONE #5: Type: IMPLANTABLE DEVICE | Site: KNEE | Status: FUNCTIONAL

## 2019-02-15 DEVICE — PSN ALL POLY PAT 32MM: Type: IMPLANTABLE DEVICE | Site: KNEE | Status: FUNCTIONAL

## 2019-02-15 DEVICE — FEMUR PSN PS CMT SZ6 R CCR STD: Type: IMPLANTABLE DEVICE | Site: KNEE | Status: FUNCTIONAL

## 2019-02-15 DEVICE — CEMENT BONE WHOLE BATCH: Type: IMPLANTABLE DEVICE | Site: KNEE | Status: FUNCTIONAL

## 2019-02-15 DEVICE — TIBIA BASEPLT SZ E 5 DEG R CEM: Type: IMPLANTABLE DEVICE | Site: KNEE | Status: FUNCTIONAL

## 2019-02-15 DEVICE — INSERT 11MM SZ 6-9 EF: Type: IMPLANTABLE DEVICE | Site: KNEE | Status: FUNCTIONAL

## 2019-02-15 RX ORDER — LIDOCAINE HYDROCHLORIDE 10 MG/ML
1 INJECTION, SOLUTION EPIDURAL; INFILTRATION; INTRACAUDAL; PERINEURAL
Status: DISCONTINUED | OUTPATIENT
Start: 2019-02-15 | End: 2019-02-15 | Stop reason: HOSPADM

## 2019-02-15 RX ORDER — CEFAZOLIN SODIUM 1 G/3ML
2 INJECTION, POWDER, FOR SOLUTION INTRAMUSCULAR; INTRAVENOUS
Status: DISPENSED | OUTPATIENT
Start: 2019-02-15 | End: 2019-02-16

## 2019-02-15 RX ORDER — GENTAMICIN SULFATE 40 MG/ML
INJECTION, SOLUTION INTRAMUSCULAR; INTRAVENOUS
Status: DISCONTINUED | OUTPATIENT
Start: 2019-02-15 | End: 2019-02-15 | Stop reason: HOSPADM

## 2019-02-15 RX ORDER — WARFARIN SODIUM 5 MG/1
5 TABLET ORAL DAILY
Status: DISCONTINUED | OUTPATIENT
Start: 2019-02-16 | End: 2019-02-16

## 2019-02-15 RX ORDER — ASPIRIN 81 MG/1
81 TABLET ORAL 2 TIMES DAILY
Qty: 120 TABLET | Refills: 0 | Status: SHIPPED | OUTPATIENT
Start: 2019-02-15 | End: 2019-05-18

## 2019-02-15 RX ORDER — OXYCODONE HYDROCHLORIDE 5 MG/1
TABLET ORAL
Status: COMPLETED
Start: 2019-02-15 | End: 2019-02-15

## 2019-02-15 RX ORDER — SODIUM CHLORIDE 0.9 % (FLUSH) 0.9 %
3 SYRINGE (ML) INJECTION EVERY 8 HOURS PRN
Status: DISCONTINUED | OUTPATIENT
Start: 2019-02-15 | End: 2019-02-17 | Stop reason: HOSPADM

## 2019-02-15 RX ORDER — BUDESONIDE 0.25 MG/2ML
0.25 INHALANT ORAL EVERY 12 HOURS
Status: DISCONTINUED | OUTPATIENT
Start: 2019-02-15 | End: 2019-02-17 | Stop reason: HOSPADM

## 2019-02-15 RX ORDER — ACETAMINOPHEN 10 MG/ML
INJECTION, SOLUTION INTRAVENOUS
Status: COMPLETED
Start: 2019-02-15 | End: 2019-02-15

## 2019-02-15 RX ORDER — MORPHINE SULFATE 2 MG/ML
2 INJECTION, SOLUTION INTRAMUSCULAR; INTRAVENOUS
Status: DISCONTINUED | OUTPATIENT
Start: 2019-02-15 | End: 2019-02-17 | Stop reason: HOSPADM

## 2019-02-15 RX ORDER — OXYCODONE AND ACETAMINOPHEN 10; 325 MG/1; MG/1
1 TABLET ORAL EVERY 4 HOURS PRN
Qty: 50 TABLET | Refills: 0 | Status: SHIPPED | OUTPATIENT
Start: 2019-02-15 | End: 2019-02-28 | Stop reason: SDUPTHER

## 2019-02-15 RX ORDER — CLOPIDOGREL BISULFATE 75 MG/1
75 TABLET ORAL DAILY
Qty: 30 TABLET | Refills: 0 | Status: ON HOLD | OUTPATIENT
Start: 2019-03-01 | End: 2019-07-29 | Stop reason: SDUPTHER

## 2019-02-15 RX ORDER — PREGABALIN 75 MG/1
75 CAPSULE ORAL
Status: COMPLETED | OUTPATIENT
Start: 2019-02-15 | End: 2019-02-15

## 2019-02-15 RX ORDER — PROMETHAZINE HYDROCHLORIDE 12.5 MG/1
12.5 TABLET ORAL EVERY 6 HOURS PRN
Qty: 60 TABLET | Refills: 0 | Status: SHIPPED | OUTPATIENT
Start: 2019-02-15 | End: 2019-07-26 | Stop reason: CLARIF

## 2019-02-15 RX ORDER — LOSARTAN POTASSIUM 25 MG/1
25 TABLET ORAL DAILY
Status: DISCONTINUED | OUTPATIENT
Start: 2019-02-15 | End: 2019-02-17 | Stop reason: HOSPADM

## 2019-02-15 RX ORDER — ARIPIPRAZOLE 2 MG/1
4 TABLET ORAL NIGHTLY
Status: DISCONTINUED | OUTPATIENT
Start: 2019-02-15 | End: 2019-02-17 | Stop reason: HOSPADM

## 2019-02-15 RX ORDER — OXYCODONE HYDROCHLORIDE 5 MG/1
5 TABLET ORAL
Status: DISCONTINUED | OUTPATIENT
Start: 2019-02-15 | End: 2019-02-17 | Stop reason: HOSPADM

## 2019-02-15 RX ORDER — SODIUM CHLORIDE 0.9 % (FLUSH) 0.9 %
3 SYRINGE (ML) INJECTION
Status: DISCONTINUED | OUTPATIENT
Start: 2019-02-15 | End: 2019-02-15 | Stop reason: HOSPADM

## 2019-02-15 RX ORDER — ALBUTEROL SULFATE 2.5 MG/.5ML
2.5 SOLUTION RESPIRATORY (INHALATION) EVERY 6 HOURS
Status: DISPENSED | OUTPATIENT
Start: 2019-02-15 | End: 2019-02-16

## 2019-02-15 RX ORDER — KETAMINE HYDROCHLORIDE 10 MG/ML
INJECTION, SOLUTION INTRAMUSCULAR; INTRAVENOUS
Status: DISCONTINUED | OUTPATIENT
Start: 2019-02-15 | End: 2019-02-15

## 2019-02-15 RX ORDER — ACETAMINOPHEN 500 MG
1000 TABLET ORAL EVERY 6 HOURS
Status: DISPENSED | OUTPATIENT
Start: 2019-02-15 | End: 2019-02-17

## 2019-02-15 RX ORDER — DEXAMETHASONE SODIUM PHOSPHATE 4 MG/ML
INJECTION, SOLUTION INTRA-ARTICULAR; INTRALESIONAL; INTRAMUSCULAR; INTRAVENOUS; SOFT TISSUE
Status: DISCONTINUED | OUTPATIENT
Start: 2019-02-15 | End: 2019-02-15

## 2019-02-15 RX ORDER — ROSUVASTATIN CALCIUM 10 MG/1
10 TABLET, COATED ORAL NIGHTLY
Status: DISCONTINUED | OUTPATIENT
Start: 2019-02-15 | End: 2019-02-17 | Stop reason: HOSPADM

## 2019-02-15 RX ORDER — FAMOTIDINE 20 MG/1
20 TABLET, FILM COATED ORAL 2 TIMES DAILY
Qty: 60 TABLET | Refills: 0 | Status: SHIPPED | OUTPATIENT
Start: 2019-02-15 | End: 2019-07-26 | Stop reason: CLARIF

## 2019-02-15 RX ORDER — ASPIRIN 81 MG/1
81 TABLET ORAL 2 TIMES DAILY
Status: DISCONTINUED | OUTPATIENT
Start: 2019-02-15 | End: 2019-02-17 | Stop reason: HOSPADM

## 2019-02-15 RX ORDER — MUPIROCIN 20 MG/G
1 OINTMENT TOPICAL
Status: COMPLETED | OUTPATIENT
Start: 2019-02-15 | End: 2019-02-15

## 2019-02-15 RX ORDER — SODIUM CHLORIDE 9 MG/ML
INJECTION, SOLUTION INTRAVENOUS CONTINUOUS
Status: ACTIVE | OUTPATIENT
Start: 2019-02-15 | End: 2019-02-16

## 2019-02-15 RX ORDER — SODIUM CHLORIDE 9 MG/ML
INJECTION, SOLUTION INTRAVENOUS
Status: COMPLETED | OUTPATIENT
Start: 2019-02-15 | End: 2019-02-15

## 2019-02-15 RX ORDER — PHENYLEPHRINE HYDROCHLORIDE 10 MG/ML
INJECTION INTRAVENOUS
Status: DISCONTINUED | OUTPATIENT
Start: 2019-02-15 | End: 2019-02-15

## 2019-02-15 RX ORDER — BISACODYL 10 MG
10 SUPPOSITORY, RECTAL RECTAL EVERY 12 HOURS PRN
Status: DISCONTINUED | OUTPATIENT
Start: 2019-02-15 | End: 2019-02-17 | Stop reason: HOSPADM

## 2019-02-15 RX ORDER — VANCOMYCIN HYDROCHLORIDE 500 MG/10ML
INJECTION, POWDER, LYOPHILIZED, FOR SOLUTION INTRAVENOUS
Status: DISCONTINUED | OUTPATIENT
Start: 2019-02-15 | End: 2019-02-15 | Stop reason: HOSPADM

## 2019-02-15 RX ORDER — CEFAZOLIN SODIUM 1 G/3ML
2 INJECTION, POWDER, FOR SOLUTION INTRAMUSCULAR; INTRAVENOUS
Status: COMPLETED | OUTPATIENT
Start: 2019-02-15 | End: 2019-02-15

## 2019-02-15 RX ORDER — ROPIVACAINE HYDROCHLORIDE 2 MG/ML
8 INJECTION, SOLUTION EPIDURAL; INFILTRATION; PERINEURAL CONTINUOUS
Status: DISCONTINUED | OUTPATIENT
Start: 2019-02-15 | End: 2019-02-17 | Stop reason: HOSPADM

## 2019-02-15 RX ORDER — VANCOMYCIN HCL IN 5 % DEXTROSE 1.5G/250ML
1500 PLASTIC BAG, INJECTION (ML) INTRAVENOUS
Status: COMPLETED | OUTPATIENT
Start: 2019-02-15 | End: 2019-02-15

## 2019-02-15 RX ORDER — MIDAZOLAM HYDROCHLORIDE 1 MG/ML
0.5 INJECTION INTRAMUSCULAR; INTRAVENOUS
Status: DISCONTINUED | OUTPATIENT
Start: 2019-02-15 | End: 2019-02-15

## 2019-02-15 RX ORDER — MIDAZOLAM HYDROCHLORIDE 1 MG/ML
1 INJECTION INTRAMUSCULAR; INTRAVENOUS EVERY 5 MIN PRN
Status: DISCONTINUED | OUTPATIENT
Start: 2019-02-15 | End: 2019-02-15

## 2019-02-15 RX ORDER — OXYCODONE HYDROCHLORIDE 5 MG/1
5 TABLET ORAL
Status: DISCONTINUED | OUTPATIENT
Start: 2019-02-15 | End: 2019-02-15

## 2019-02-15 RX ORDER — FENTANYL CITRATE 50 UG/ML
25 INJECTION, SOLUTION INTRAMUSCULAR; INTRAVENOUS EVERY 5 MIN PRN
Status: DISCONTINUED | OUTPATIENT
Start: 2019-02-15 | End: 2019-02-15

## 2019-02-15 RX ORDER — ACETAMINOPHEN 10 MG/ML
1000 INJECTION, SOLUTION INTRAVENOUS ONCE
Status: COMPLETED | OUTPATIENT
Start: 2019-02-15 | End: 2019-02-15

## 2019-02-15 RX ORDER — ROPIVACAINE HYDROCHLORIDE 2 MG/ML
INJECTION, SOLUTION EPIDURAL; INFILTRATION; PERINEURAL
Status: COMPLETED
Start: 2019-02-15 | End: 2019-02-15

## 2019-02-15 RX ORDER — OXYCODONE HYDROCHLORIDE 10 MG/1
10 TABLET ORAL
Status: DISCONTINUED | OUTPATIENT
Start: 2019-02-15 | End: 2019-02-15

## 2019-02-15 RX ORDER — PROPOFOL 10 MG/ML
VIAL (ML) INTRAVENOUS CONTINUOUS PRN
Status: DISCONTINUED | OUTPATIENT
Start: 2019-02-15 | End: 2019-02-15

## 2019-02-15 RX ORDER — WARFARIN 2.5 MG/1
2.5 TABLET ORAL DAILY
Qty: 90 TABLET | Refills: 0 | Status: SHIPPED | OUTPATIENT
Start: 2019-02-15 | End: 2019-05-18

## 2019-02-15 RX ORDER — ONDANSETRON 2 MG/ML
4 INJECTION INTRAMUSCULAR; INTRAVENOUS EVERY 8 HOURS PRN
Status: DISCONTINUED | OUTPATIENT
Start: 2019-02-15 | End: 2019-02-17 | Stop reason: HOSPADM

## 2019-02-15 RX ORDER — NALOXONE HCL 0.4 MG/ML
0.02 VIAL (ML) INJECTION
Status: DISCONTINUED | OUTPATIENT
Start: 2019-02-15 | End: 2019-02-17 | Stop reason: HOSPADM

## 2019-02-15 RX ORDER — LIDOCAINE HCL/PF 100 MG/5ML
SYRINGE (ML) INTRAVENOUS
Status: DISCONTINUED | OUTPATIENT
Start: 2019-02-15 | End: 2019-02-15

## 2019-02-15 RX ORDER — RAMELTEON 8 MG/1
8 TABLET ORAL NIGHTLY PRN
Status: DISCONTINUED | OUTPATIENT
Start: 2019-02-15 | End: 2019-02-17 | Stop reason: HOSPADM

## 2019-02-15 RX ORDER — FAMOTIDINE 20 MG/1
20 TABLET, FILM COATED ORAL DAILY
Status: DISCONTINUED | OUTPATIENT
Start: 2019-02-15 | End: 2019-02-17 | Stop reason: HOSPADM

## 2019-02-15 RX ORDER — POLYETHYLENE GLYCOL 3350 17 G/17G
17 POWDER, FOR SOLUTION ORAL DAILY
Qty: 510 G | Refills: 0 | Status: SHIPPED | OUTPATIENT
Start: 2019-02-15 | End: 2019-07-26 | Stop reason: CLARIF

## 2019-02-15 RX ORDER — OXYCODONE HYDROCHLORIDE 10 MG/1
10 TABLET ORAL
Status: DISCONTINUED | OUTPATIENT
Start: 2019-02-15 | End: 2019-02-17 | Stop reason: HOSPADM

## 2019-02-15 RX ORDER — AMOXICILLIN 250 MG
1 CAPSULE ORAL 2 TIMES DAILY
Status: DISCONTINUED | OUTPATIENT
Start: 2019-02-15 | End: 2019-02-17 | Stop reason: HOSPADM

## 2019-02-15 RX ORDER — DOCUSATE SODIUM 100 MG/1
100 CAPSULE, LIQUID FILLED ORAL 3 TIMES DAILY
Qty: 90 CAPSULE | Refills: 0 | Status: SHIPPED | OUTPATIENT
Start: 2019-02-15 | End: 2019-07-26 | Stop reason: CLARIF

## 2019-02-15 RX ORDER — PREGABALIN 75 MG/1
75 CAPSULE ORAL NIGHTLY
Status: DISCONTINUED | OUTPATIENT
Start: 2019-02-15 | End: 2019-02-17 | Stop reason: HOSPADM

## 2019-02-15 RX ADMIN — PREGABALIN 75 MG: 75 CAPSULE ORAL at 06:02

## 2019-02-15 RX ADMIN — PHENYLEPHRINE HYDROCHLORIDE 200 MCG: 10 INJECTION INTRAVENOUS at 08:02

## 2019-02-15 RX ADMIN — SODIUM CHLORIDE: 0.9 INJECTION, SOLUTION INTRAVENOUS at 09:02

## 2019-02-15 RX ADMIN — MIDAZOLAM HYDROCHLORIDE 1 MG: 1 INJECTION, SOLUTION INTRAMUSCULAR; INTRAVENOUS at 06:02

## 2019-02-15 RX ADMIN — ACETAMINOPHEN 1000 MG: 10 INJECTION, SOLUTION INTRAVENOUS at 09:02

## 2019-02-15 RX ADMIN — DEXAMETHASONE SODIUM PHOSPHATE 8 MG: 4 INJECTION, SOLUTION INTRAMUSCULAR; INTRAVENOUS at 07:02

## 2019-02-15 RX ADMIN — KETAMINE HYDROCHLORIDE 10 MG: 10 INJECTION, SOLUTION INTRAMUSCULAR; INTRAVENOUS at 07:02

## 2019-02-15 RX ADMIN — WARFARIN SODIUM 7.5 MG: 2.5 TABLET ORAL at 07:02

## 2019-02-15 RX ADMIN — CEFAZOLIN 2 G: 330 INJECTION, POWDER, FOR SOLUTION INTRAMUSCULAR; INTRAVENOUS at 07:02

## 2019-02-15 RX ADMIN — OXYCODONE HYDROCHLORIDE 10 MG: 10 TABLET ORAL at 10:02

## 2019-02-15 RX ADMIN — SODIUM CHLORIDE: 0.9 INJECTION, SOLUTION INTRAVENOUS at 08:02

## 2019-02-15 RX ADMIN — ALBUTEROL SULFATE 2.5 MG: 2.5 SOLUTION RESPIRATORY (INHALATION) at 09:02

## 2019-02-15 RX ADMIN — ASPIRIN 81 MG: 81 TABLET, COATED ORAL at 09:02

## 2019-02-15 RX ADMIN — MEPIVACAINE HYDROCHLORIDE 3 ML: 15 INJECTION, SOLUTION EPIDURAL; INFILTRATION at 07:02

## 2019-02-15 RX ADMIN — STANDARDIZED SENNA CONCENTRATE AND DOCUSATE SODIUM 1 TABLET: 8.6; 5 TABLET, FILM COATED ORAL at 09:02

## 2019-02-15 RX ADMIN — CEFAZOLIN 2 G: 330 INJECTION, POWDER, FOR SOLUTION INTRAMUSCULAR; INTRAVENOUS at 11:02

## 2019-02-15 RX ADMIN — PHENYLEPHRINE HYDROCHLORIDE 100 MCG: 10 INJECTION INTRAVENOUS at 07:02

## 2019-02-15 RX ADMIN — FAMOTIDINE 20 MG: 20 TABLET ORAL at 09:02

## 2019-02-15 RX ADMIN — PROPOFOL 50 MCG/KG/MIN: 10 INJECTION, EMULSION INTRAVENOUS at 07:02

## 2019-02-15 RX ADMIN — LIDOCAINE HYDROCHLORIDE 80 MG: 20 INJECTION, SOLUTION INTRAVENOUS at 07:02

## 2019-02-15 RX ADMIN — PHENYLEPHRINE HYDROCHLORIDE 100 MCG: 10 INJECTION INTRAVENOUS at 08:02

## 2019-02-15 RX ADMIN — ROPIVACAINE HYDROCHLORIDE 8 ML/HR: 2 INJECTION, SOLUTION EPIDURAL; INFILTRATION at 09:02

## 2019-02-15 RX ADMIN — VANCOMYCIN HYDROCHLORIDE 1500 MG: 100 INJECTION, POWDER, LYOPHILIZED, FOR SOLUTION INTRAVENOUS at 08:02

## 2019-02-15 RX ADMIN — MUPIROCIN 1 G: 20 OINTMENT TOPICAL at 06:02

## 2019-02-15 RX ADMIN — VANCOMYCIN HYDROCHLORIDE 1500 MG: 100 INJECTION, POWDER, LYOPHILIZED, FOR SOLUTION INTRAVENOUS at 06:02

## 2019-02-15 RX ADMIN — LOSARTAN POTASSIUM 25 MG: 25 TABLET, FILM COATED ORAL at 09:02

## 2019-02-15 RX ADMIN — PREGABALIN 75 MG: 75 CAPSULE ORAL at 09:02

## 2019-02-15 RX ADMIN — ACETAMINOPHEN 1000 MG: 500 TABLET ORAL at 07:02

## 2019-02-15 RX ADMIN — SODIUM CHLORIDE: 0.9 INJECTION, SOLUTION INTRAVENOUS at 06:02

## 2019-02-15 RX ADMIN — OXYCODONE HYDROCHLORIDE 10 MG: 5 TABLET ORAL at 10:02

## 2019-02-15 NOTE — ASSESSMENT & PLAN NOTE
84 y.o. male POD1 s/p Right TKA    Pain control: multimodal  PT/OT: WBAT RLE  DVT PPx: ASA 81 BID and coumadin.  FCDs at all times when not ambulating  Abx: postop Ancef/vanc  Labs: none  Drain: none  Estrada: none    Dispo: f/u PT recs

## 2019-02-15 NOTE — NURSING TRANSFER
Nursing Transfer Note      2/15/2019     Transfer To: 541a    Transfer via stretcher    Transfer with 2l to O2, shashi, polar ice , perinural    Transported by transport    Medicines sent: no    Chart send with patient: Yes    Notified: spouse

## 2019-02-15 NOTE — PLAN OF CARE
[facility]    HOME HEALTH ORDERS  FACE TO FACE ENCOUNTER    Patient Name: Mayur Roblero  YOB: 1935    PCP: Primary Doctor No   PCP Address: None  PCP Phone Number: None  PCP Fax: None    Encounter Date: 02/15/2019    Admit to Home Health    Diagnoses:  Active Hospital Problems    Diagnosis  POA    *Primary osteoarthritis of right knee [M17.11]  Yes      Resolved Hospital Problems   No resolved problems to display.       Future Appointments   Date Time Provider Department Center   2/28/2019 10:30 AM Shaista Ordaz NP [facility]           I have seen and examined this patient face to face today. My clinical findings that support the need for the home health skilled services and home bound status are the following:  Weakness/numbness causing balance and gait disturbance due to Joint Replacement making it taxing to leave home.    Allergies:  Review of patient's allergies indicates:   Allergen Reactions    Dilaudid [hydromorphone] Nausea And Vomiting    Keflex [cephalexin] Hives    Morphine Anaphylaxis     Patient's wife states it caused an ileus.  Hospitalized for 9 days    Moxifloxacin Other (See Comments)     Fever  Fever    Tiotropium bromide Anaphylaxis    Ondansetron Other (See Comments)     Vomiting blood    Codeine Other (See Comments)     unknown       Diet: regular diet    Activities: activity as tolerated    Home Health Admitting Clinician:   SN/PT to complete comprehensive assessment including routine vital signs. Instruct on disease process and s/s of complications to report to MD. Follow specific home health arthoplasty protocol. Review/verify medication list sent home with the patient at time of discharge  and instruct patient/caregiver as needed. If coumadin ordered, coumadin clinic to manage INR with INR draws 2x per week with a goal to maintain INR between 1.8 and 2.2. Frequency may be adjusted depending on start of care date.    Notify MD if SBP >  160 or < 90; DBP > 90 or < 50; HR > 120 or < 50; Temp > 101    Home Medical Equipment:  Walker, 3-1 bedside commode, transfer tub bench    CONSULTS:    Physical Therapy may admit if patient not on coumadin, PT to perform comprehensive assessment if performing admit visit and generate therapy plan of care. Evaluate for home safety and equipment needs; Establish/upgrade home exercise program. Perform/instruct on therapeutic exercises, gait training, transfer training, and Range of Motion.      OTHER: (only select if patient needs other therapy disciplines)  Occupational Therapy to evaluate and treat. Evaluate home environment for safety and equipment needs. Perform/Instruct on transfers, ADL training, ROM, and therapeutic exercises.   to evaluate for community resources/long-range planning.      WOUND CARE ORDERS:  Do not remove surgical dressing for 2 weeks post-op. This will be done only by MD at initial post-op visit. If dressing is completely saturated, replace with identical dressing - silver-impregnated hydrocolloid dressing.     Do not get dressings wet. Do not shower.     If dressing continues to be saturated or there are signs of infection, please call Ortho Clinic [phone] for further instructions and to make appt to be seen.     Medications: Review discharge medications with patient and family and provide education.      Current Discharge Medication List      START taking these medications    Details   docusate sodium 100 mg capsule Take 100 mg by mouth 3 (three) times daily.  Qty: 90 tablet, Refills: 0      famotidine (PEPCID) 20 MG tablet Take 1 tablet (20 mg total) by mouth 2 (two) times daily.  Qty: 60 tablet, Refills: 0      oxyCODONE-acetaminophen (PERCOCET)  mg per tablet Take 1 tablet by mouth every 4 (four) hours as needed for Pain.  Qty: 50 tablet, Refills: 0      polyethylene glycol (GLYCOLAX) 17 gram PwPk Take 17 g by mouth once daily.  Qty: 30 each, Refills: 0       promethazine (PHENERGAN) 12.5 MG Tab Take 1 tablet (12.5 mg total) by mouth every 6 (six) hours as needed.  Qty: 60 tablet, Refills: 0      warfarin (COUMADIN) 2.5 MG tablet Take 1 tablet (2.5 mg total) by mouth Daily. Take 1 tablet daily unless directed otherwise by Coumadin Clinic.  Qty: 90 tablet, Refills: 0         CONTINUE these medications which have CHANGED    Details   aspirin (ECOTRIN) 81 MG EC tablet Take 1 tablet (81 mg total) by mouth 2 (two) times daily.  Qty: 120 tablet, Refills: 0      clopidogrel (PLAVIX) 75 mg tablet Take 1 tablet (75 mg total) by mouth once daily.  Qty: 30 tablet, Refills: 0   Re start the clopidogrel 3/1/19  In place of coumadin.       CONTINUE these medications which have NOT CHANGED    Details   ARIPiprazole (ABILIFY) 2 MG Tab TAKE 2 TABLETS EVERY DAY  Qty: 180 tablet, Refills: 2    Associated Diagnoses: Tics of organic origin      azilsartan medoxomil (EDARBI) 40 mg Tab Take 20 mg by mouth once daily.       !! budesonide 180mcg (PULMICORT FLEXHALER) 180 mcg/actuation AePB Inhale 2 puffs into the lungs 2 (two) times daily.  Qty: 1 each, Refills: 5    Associated Diagnoses: Centrilobular emphysema      !! CRESTOR 10 mg tablet TAKE 1 TABLET BY MOUTH EVERY DAY  Qty: 30 tablet, Refills: 6      !! CRESTOR 10 mg tablet TAKE 1 TABLET BY MOUTH EVERY DAY  Qty: 30 tablet, Refills: 6      duloxetine (CYMBALTA) 30 MG capsule Take 1 capsule (30 mg total) by mouth once daily.  Qty: 30 capsule, Refills: 11      levalbuterol (XOPENEX HFA) 45 mcg/actuation inhaler Inhale 2 puffs into the lungs every 6 (six) hours as needed for Wheezing or Shortness of Breath.  Qty: 1 Inhaler, Refills: 4    Associated Diagnoses: Centrilobular emphysema      OMEGA-3S/DHA/EPA/FISH OIL (OMEGA 3 ORAL) Take 1,000 mg by mouth once daily.       !! PULMICORT FLEXHALER 180 mcg/actuation AePB USE 2 PUFFS 2 TIMES DAILY AS DIRECTED  Qty: 1 each, Refills: 10      vit A/vit C/vit E/zinc/copper (PRESERVISION AREDS ORAL) Take  by mouth.       !! - Potential duplicate medications found. Please discuss with provider.          I certify that this patient is confined to his home and needs intermittent skilled nursing care, physical therapy and occupational therapy.

## 2019-02-15 NOTE — PLAN OF CARE
Problem: Occupational Therapy Goal  Goal: Occupational Therapy Goal  Goals to be met by: 7 days    Patient will increase functional independence with ADLs by performing:    UE Dressing with Supervision.  LE Dressing with Supervision with AD as needed.  Grooming while standing with Supervision.  Toileting from bedside commode with Supervision for hygiene and clothing management.   Stand pivot transfers with Supervision.  Toilet transfer to bedside commode with Supervision.       Outcome: Ongoing (interventions implemented as appropriate)  OT eval complete. Pt tolerated session well. Pt's functional outcomes established today based on his presenting functional level.     Comments: Cont OT POC

## 2019-02-15 NOTE — PLAN OF CARE
Problem: Physical Therapy Goal  Goal: Physical Therapy Goal  Goals to be met by: 19     Patient will increase functional independence with mobility by performin. Supine to sit with Set-up [place]  2. Sit to supine with Set-up [place]  3. Sit to stand transfer with Supervision  4. Bed to chair transfer with Stand-by Assistance using Rolling Walker  5. Gait  x 150 feet with Stand-by Assistance using Rolling Walker.   6. Ascend/Descend 7 inch curb step with Contact Guard Assistance using Rolling Walker.  7. Lower extremity exercise program x20-30 reps per handout, with independence      Outcome: Ongoing (interventions implemented as appropriate)  Pt tolerated session well today with no complications and demonstrated appropriate mobility s/p (R) TKA. Pt with no LOB during ambulation using SW for support. Pt able to follow 3-point gait sequencing well with no complications. Pt will cont to benefit from skilled therapy services and is appropriate for HHPT upon d/c.

## 2019-02-15 NOTE — ANESTHESIA PROCEDURE NOTES
Right adductor canal catheter    Patient location during procedure: pre-op   Block not for primary anesthetic.  Reason for block: at surgeon's request and post-op pain management   Post-op Pain Location: right knee pain  Start time: 2/15/2019 6:55 AM  Timeout: 2/15/2019 6:30 AM   End time: 2/15/2019 6:55 AM  Staffing  Anesthesiologist: Janet Scanlon MD  Resident/CRNA: Anthony Pérez MD  Performed: resident/CRNA and anesthesiologist   Preanesthetic Checklist  Completed: patient identified, site marked, surgical consent, pre-op evaluation, timeout performed, IV checked, risks and benefits discussed and monitors and equipment checked  Peripheral Block  Patient position: supine  Prep: ChloraPrep and site prepped and draped  Patient monitoring: heart rate, cardiac monitor, continuous pulse ox, continuous capnometry and frequent blood pressure checks  Block type: adductor canal  Laterality: right  Injection technique: continuous  Needle  Needle type: Tuohy   Needle gauge: 17 G  Needle length: 3.5 in  Needle localization: anatomical landmarks and ultrasound guidance  Catheter type: spring wound  Catheter size: 19 G  Test dose: lidocaine 1.5% with Epi 1-to-200,000 and negative   -ultrasound image captured on disc.  Assessment  Injection assessment: negative aspiration, negative parasthesia and local visualized surrounding nerve  Paresthesia pain: none  Heart rate change: no  Slow fractionated injection: yes  Additional Notes  VSS.  [facility] RN monitoring vitals throughout procedure.  Patient tolerated procedure well.     ropivicaine 0.25% with 1;[id number] epi

## 2019-02-15 NOTE — ANESTHESIA PROCEDURE NOTES
Spinal    Diagnosis: Right knee osteoarthritis  Patient location during procedure: OR  Start time: 2/15/2019 7:10 AM  Timeout: 2/15/2019 7:10 AM  End time: 2/15/2019 7:15 AM  Staffing  Anesthesiologist: Jens Mccarthy MD  Performed: anesthesiologist   Preanesthetic Checklist  Completed: patient identified, site marked, surgical consent, pre-op evaluation, timeout performed, IV checked, risks and benefits discussed and monitors and equipment checked  Spinal Block  Patient position: sitting  Prep: ChloraPrep  Patient monitoring: continuous pulse ox and frequent blood pressure checks  Approach: midline  Location: L3-4  Injection technique: single shot  CSF Fluid: clear free-flowing CSF  Needle  Needle type: Sp   Needle gauge: 25 G  Needle length: 3.5 in  Additional Documentation: negative aspiration for heme and no paresthesia on injection  Needle localization: anatomical landmarks  Assessment  Sensory level: T6   Dermatomal levels determined by alcohol wipe  Ease of block: easy  Patient's tolerance of the procedure: comfortable throughout block and no complaints

## 2019-02-15 NOTE — PT/OT/SLP EVAL
Physical Therapy Evaluation    Patient Name:  Mayur Roblero   MRN:  [id number]    Recommendations:     Discharge Recommendations:  (HHPT)   Discharge Equipment Recommendations: walker, rolling, commode   Barriers to discharge: None    Assessment:     Mayur Roblero is a 84 y.o. male admitted with a medical diagnosis of Primary osteoarthritis of right knee.  He presents with the following impairments/functional limitations:  impaired endurance, impaired self care skills, impaired balance, gait instability, impaired functional mobilty, weakness, pain, impaired joint extensibility, decreased lower extremity function, decreased ROM, orthopedic precautions, impaired skin.    -Pt tolerated session well today with no complications and demonstrated appropriate mobility s/p (R) TKA. Pt with no LOB during ambulation using SW for support. Pt able to follow 3-point gait sequencing well with no complications. Pt will cont to benefit from skilled therapy services and is appropriate for HHPT upon d/c.    Rehab Prognosis: Good; patient would benefit from acute skilled PT services to address these deficits and reach maximum level of function.    Recent Surgery: Procedure(s) (LRB):  REPLACEMENT-KNEE-TOTAL (Right) Day of Surgery    Plan:     During this hospitalization, patient to be seen BID to address the identified rehab impairments via gait training, therapeutic activities, therapeutic exercises, neuromuscular re-education and progress toward the following goals:    · Plan of Care Expires:  03/15/19    Subjective     Chief Complaint: impaired mobility  Patient/Family Comments/goals: return home to [facility]  Pain/Comfort:  · Pain Rating 1: 0/10    Patients cultural, spiritual, Faith conflicts given the current situation:      Living Environment:  -Pt lives with his wife in a [facility] with 1 TH step to enter and no handrails. Pt has a tub/shower combo with a shower chair.   Prior to admission, patients level of function was (I).  Equipment  used at home: walker, standard, shower chair.  DME owned (not currently used): .  Upon discharge, patient will have assistance from wife.    Objective:     Communicated with nsg prior to session.  Patient found all lines intact peripheral IV, perineural catheter, FCD, cryotherapy  upon PT entry to room.    General Precautions: Standard, fall   Orthopedic Precautions:RLE weight bearing as tolerated   Braces: N/A     Exams:  · Sensation:    · -       Intact  light/touch (B) LE  · RLE ROM: WFL except limits from bulky dressing  · RLE Strength: Deficits: 4/5 grossly  · LLE ROM: WNL  · LLE Strength: WNL    Functional Mobility:  · Bed Mobility:     · Scooting: contact guard assistance  · Supine to Sit: contact guard assistance  · Sit to Supine: contact guard assistance  · Transfers:     · Sit to Stand:  contact guard assistance with standard walker  · Gait: 3 steps fwd/back, 3 lateral steps using SW      Therapeutic Activities and Exercises:   -Pt educated on:  A. PT POC and role of PT  B. Importance of OOB activity to improve functional outcomes   C. DME mgmt and t/f sequencing  D. Performing HEP to reduce the risk of developing blood clots  E. Gait sequencing   F. D/c planning    AM-PAC 6 CLICK MOBILITY  Total Score:18     Patient left supine with all lines intact, call button in reach and nsg notified.    GOALS:   Multidisciplinary Problems     Physical Therapy Goals        Problem: Physical Therapy Goal    Goal Priority Disciplines Outcome Goal Variances Interventions   Physical Therapy Goal     PT, PT/OT Ongoing (interventions implemented as appropriate)     Description:  Goals to be met by: 19     Patient will increase functional independence with mobility by performin. Supine to sit with Set-up [place]  2. Sit to supine with Set-up [place]  3. Sit to stand transfer with Supervision  4. Bed to chair transfer with Stand-by Assistance using Rolling Walker  5. Gait  x 150 feet with Stand-by  Assistance using Rolling Walker.   6. Ascend/Descend 7 inch curb step with Contact Guard Assistance using Rolling Walker.  7. Lower extremity exercise program x20-30 reps per handout, with independence                        History:     Past Medical History:   Diagnosis Date    Bladder cancer     Coronary artery disease     Emphysema of lung     Hyperlipidemia     Pneumonia     Pulmonary nodule        Past Surgical History:   Procedure Laterality Date    CARDIAC CATHETERIZATION  10/28/2008    rca stent       Time Tracking:     PT Received On: 02/15/19  PT Start Time: 1145     PT Stop Time: 1205  PT Total Time (min): 20 min     Billable Minutes: Evaluation 12 and Gait Training 8      Nehemias Sloan, PT  02/15/2019

## 2019-02-15 NOTE — BRIEF OP NOTE
[facility]  Surgery Department  Operative Note    SUMMARY     Date of Procedure: 2/15/2019     Procedure: Procedure(s) (LRB):  REPLACEMENT-KNEE-TOTAL (Right)     Surgeon(s) and Role:     * John L. [facility]ner Jr., MD - Primary     * Matthew Arora III, MD - Assisting     * Liborio Valencia MD - Resident - Assisting        Pre-Operative Diagnosis: Osteoarthritis of knee, unspecified (CODE) [M17.9]    Post-Operative Diagnosis: Post-Op Diagnosis Codes:     * Osteoarthritis of knee, unspecified (CODE) [M17.9]    Anesthesia: Femoral Block    Technical Procedures Used:  PS       Description of the Findings of the Procedure: Varus    Significant Surgical Tasks Conducted by the Assistant(s), if Applicable: closure    Complications: No    Estimated Blood Loss (EBL): 100cc             Implants:   Implant Name Type Inv. Item Serial No.  Lot No. LRB No. Used   PLUG BONE #5 - [id number]  PLUG BONE #5  [employer] DELORIS. [id number] Right 1   SCREW HEX HEAD - [id number]  SCREW HEX HEAD  ALISON,INC  Right 1   BIT DRILL PIN TROCAR 3.2MM - [id number]  BIT DRILL PIN TROCAR 3.2MM  [employer]ISON,INC  Right 2   SCREW HEX HEAD 3.5X38MM - [id number]  SCREW HEX HEAD 3.5X38MM  ALISON,INC  Right 2   CEMENT BONE WHOLE BATCH - [id number]  CEMENT BONE WHOLE BATCH  [employer] DELORIS. [id number] Right 2   FEMUR PSN PS CMT SZ6 R CCR STD - [id number]  FEMUR PSN PS CMT SZ6 R CCR STD  ALISON,INC [id number] Right 1   TIBIA BASEPLT SZ E 5 DEG R CIARA - [id number]  TIBIA BASEPLT SZ E 5 DEG R CIARA  ALISON,INC [id number] Right 1   PSN ALL POLY PAT 32MM - [id number]  PSN ALL POLY PAT 32MM  ALISON,INC [id number] Right 1   INSERT 11MM SZ 6-9 EF - [id number]  INSERT 11MM SZ 6-9 EF  ALISON,INC [id number] Right 1       Specimens:   Specimen (12h ago, onward)    Start     Ordered    02/15/19 0811  Specimen to Pathology - Surgery  Once     Comments:  1.  Bone and soft tissue right knee, permanent, placed in pathology refrigerator.     Start Status    02/15/19 0811 Collected (02/15/19 0811)       02/15/19 0811                  Condition: Good    Disposition: PACU - hemodynamically stable.    Attestation: I was present and scrubbed for the entire procedure.

## 2019-02-15 NOTE — ANESTHESIA PROCEDURE NOTES
Right ipck    Patient location during procedure: pre-op   Block not for primary anesthetic.  Reason for block: at surgeon's request and post-op pain management   Post-op Pain Location: right knee pain  Start time: 2/15/2019 6:55 AM  Timeout: 2/15/2019 6:55 AM   End time: 2/15/2019 7:05 AM  Staffing  Anesthesiologist: Janet Scanlon MD  Resident/CRNA: Anthony Pérez MD  Performed: anesthesiologist   Preanesthetic Checklist  Completed: patient identified, site marked, surgical consent, pre-op evaluation, timeout performed, IV checked, risks and benefits discussed and monitors and equipment checked  Peripheral Block  Patient position: supine  Prep: ChloraPrep  Patient monitoring: heart rate, cardiac monitor, continuous pulse ox, continuous capnometry and frequent blood pressure checks  Block type: I PACK  Laterality: right  Injection technique: single shot  Needle  Needle type: Stimuplex   Needle gauge: 21 G  Needle length: 4 in  Needle localization: anatomical landmarks and ultrasound guidance   -ultrasound image captured on disc.  Assessment  Injection assessment: negative aspiration, negative parasthesia and local visualized surrounding nerve  Paresthesia pain: none  Heart rate change: no  Slow fractionated injection: yes  Additional Notes  VSS.  [facility] RN monitoring vitals throughout procedure.  Patient tolerated procedure well.     Ropivacaine 0.25% with epi 1;200000

## 2019-02-15 NOTE — TRANSFER OF CARE
"Anesthesia Transfer of Care Note    Patient: Mayur Roblero    Procedure(s) Performed: Procedure(s) (LRB):  REPLACEMENT-KNEE-TOTAL (Right)    Patient location: PACU    Anesthesia Type: spinal and MAC    Transport from OR: Transported from OR on room air with adequate spontaneous ventilation    Post pain: adequate analgesia    Post assessment: no apparent anesthetic complications and tolerated procedure well    Post vital signs: stable    Level of consciousness: awake and alert    Nausea/Vomiting: no nausea/vomiting    Complications: none    Transfer of care protocol was followed      Last vitals:   Visit Vitals  BP (!) 111/59 (BP Location: Right arm, Patient Position: Lying)   Pulse 73   Temp 36.2 °C (97.2 °F) (Temporal)   Resp 20   Ht 5' 10" (1.778 m)   Wt 88.9 kg (196 lb)   SpO2 95%   BMI 28.12 kg/m²     "

## 2019-02-15 NOTE — OP NOTE
DATE OF PROCEDURE:  02/15/2019.    SURGEON:  John Lockwood [facility], Jr., M.D.    ASSISTANT:  Liborio Valencia.    OPERATION PERFORMED:  Right total knee arthroplasty.    PREOPERATIVE DIAGNOSIS:  Osteoarthritis, right knee.    POSTOPERATIVE DIAGNOSIS:  Osteoarthritis, right knee.    COMPONENTS USED:  Fe Persona knee system.  We used a size 6 femur, right   standard posterior stabilized; a size E tibia, right 5-degree stem; 11 mm   articular insert, posterior stabilized, vitamin E, highly cross-linked poly, and   a 32 mm patella.    OPERATIVE TECHNIQUE:  Estimated blood loss, 100 mL.  Resected bone and tissue   sent to Pathology for routine examination.  The patient was placed supine on the   operating table.  Spinal anesthesia was introduced.  The right leg was prepped   and draped in sterile fashion.  The room was laminar airflow.  The patient was   given preoperative antibiotics and the OR team wore the sterile exhaust suits in   order to minimize risk for infection.  A foot pump was placed on the left foot   to help prevent DVT.  Right leg was exsanguinated and the tourniquet was   inflated to 300 pounds of pressure.  A straight anterior incision was made.    Sharp dissection was carried down to the extensor mechanism.  The extensor   mechanism was opened in a straight Insall approach.  Partial release of the   medial collateral ligament was performed to correct some slight varus deformity.    The intramedullary guide was placed in the femur.  Distal cut was made at 5   degrees of valgus with a +3 setting.  The proximal tibial cut was performed,   removing about 3-4 from the medial side and about 6-7 from the lateral side.    The femur was measured for a 6 and cut posteriorly.  It was a little tight in   extension, so I took an extra 2.  That balanced the gaps nicely.  I did the   notch and chamfer-plasty on the femur, sized the tibia to an E, and drilled and   prepared it with the same rotation as the femur.   The patella was nice and   thick.  It was 25 mm and I cut it about 17.  We then Pulsavac'd and dried the   surfaces, cemented the tibial baseplate, then the femoral component, removed the   excess cement, put the 11 mm trial in, put the knee out in extension, and   cemented the patella.  While the cement was hardening, we bathed the knee in the   Betadine solution and then Pulsavac'd cleared.  With the cement was hard, we   put the 11 mm insert in and then closed the extensor mechanism with 1 Vicryl   over tranexamic acid and vancomycin powder, closed the subcutaneous tissues with   0 and 3-0 Vicryl, and closed the skin with a running subcuticular 3-0 Monocryl   and skin adhesive.  Sterile surgical dressing was applied.  There were no   complications to the procedure.      BJ  dd: 02/15/2019 09:23:57 (CST)  td: 02/15/2019 17:02:46 (CST)  Doc ID   #[id number]  Job ID #[id number]    CC:

## 2019-02-15 NOTE — PT/OT/SLP EVAL
Occupational Therapy   Evaluation/ Treatment     Name: Mayur Roblero  MRN: [id number]  Admitting Diagnosis:  Primary osteoarthritis of right knee Day of Surgery    Recommendations:     Discharge recommendations: Home w/ HH     Barriers to discharge: None    Equipment recommendations: rolling walker and bedside commode    History:     Occupational Profile:  Living Environment: Pt lives with his wife in [facility] w/ threshold entry; pt uses a tub/shower   Previous level of function: Pt reports being independent w/ ADLs and functional mobility   Equipment Owned: standard walker and shower chair   Assistance Upon Discharge: Pt reports he will have assistance from his wife upon d/c from hospital.    Past Medical History:   Diagnosis Date    Bladder cancer     Coronary artery disease     Emphysema of lung     Hyperlipidemia     Pneumonia     Pulmonary nodule        Past Surgical History:   Procedure Laterality Date    CARDIAC CATHETERIZATION  10/28/2008    rca stent       Subjective     Communicated with: RN prior to session.    Pt agreeable to Evaluation.    Pain/Comfort:  Pain level: 0/10 in R knee    Objective:     Pt found supine in bed with blood pressure cuff, johnson catheter, telemetry, PNC, PCEA, pulse ox, Peripheral IV and FCD.    Orthopedic Precautions: RLE weight bearing as tolerated    Occupational Performance:    Bed Mobility:    Supine to sit: Min A w/ leg lift   Sit to supine: Min A w/ leg lift    Transfers:    Sit<>Stand: CGA, SW    Ambulation:    Pt ambulated 3 steps forward, backward, and side stepped to HOB w/ CGA, verbal cues for gait sequence and SW - no signs of LOB or SOB noted.     Activities of Daily Living:  UE dressing: Maximum Assistance to fahad gown around back  LE dressing: Total Assistance to fahad socks  Pt educated on LE dressing techniques and need for AD with LE dressing      Patient left supine in bed with all lines intact and RN notified.    AMPA 6 Click: Self-care  AMPA Total Score:  16    Education:    Assessment:     Mayur Roblero is a 84 y.o. male with a medical diagnosis of Primary osteoarthritis of right knee.  He presents with decreased function of R LE impeding his ability to perform ADLs and functional mobility and would benefit from OT services to maximize functional (I) and safety.    Performance deficits affecting function are weakness, impaired endurance, impaired self care skills, impaired functional mobilty, gait instability, impaired balance, decreased lower extremity function, decreased safety awareness, pain, impaired skin, decreased ROM, edema, orthopedic precautions.    Rehab Prognosis:  Good    Plan:     Patient to be seen QD to address the above listed problems via self-care/home management, therapeutic activities, therapeutic exercises    Plan of Care Reviewed with: patient    This Plan of care has been discussed with the patient who was involved in its development and understands and is in agreement with the identified goals and treatment plan    GOALS:   Multidisciplinary Problems     Occupational Therapy Goals        Problem: Occupational Therapy Goal    Goal Priority Disciplines Outcome Interventions   Occupational Therapy Goal     OT, PT/OT Ongoing (interventions implemented as appropriate)    Description:  Goals to be met by: 7 days    Patient will increase functional independence with ADLs by performing:    UE Dressing with Supervision.  LE Dressing with Supervision with AD as needed.  Grooming while standing with Supervision.  Toileting from bedside commode with Supervision for hygiene and clothing management.   Stand pivot transfers with Supervision.  Toilet transfer to bedside commode with Supervision.                         Time Tracking:     OT Received On: 2/15/2019  OT Start Time: 1145   OT Stop Time: 1205   OT Total Time: 20 minutes     Billable Minutes:  Evaluation 12 minutes  Therapeutic Activity 8 minutes    Alexandra Shane  OT  2/15/2019

## 2019-02-16 LAB
INR PPP: 1.1
PROTHROMBIN TIME: 11.7 SEC

## 2019-02-16 PROCEDURE — 25000003 PHARM REV CODE 250: Performed by: STUDENT IN AN ORGANIZED HEALTH CARE EDUCATION/TRAINING PROGRAM

## 2019-02-16 PROCEDURE — 63600175 PHARM REV CODE 636 W HCPCS: Performed by: PHYSICIAN ASSISTANT

## 2019-02-16 PROCEDURE — 97530 THERAPEUTIC ACTIVITIES: CPT

## 2019-02-16 PROCEDURE — 97535 SELF CARE MNGMENT TRAINING: CPT | Mod: 59

## 2019-02-16 PROCEDURE — 36415 COLL VENOUS BLD VENIPUNCTURE: CPT

## 2019-02-16 PROCEDURE — 11000001 HC ACUTE MED/SURG PRIVATE ROOM

## 2019-02-16 PROCEDURE — 99212 OFFICE O/P EST SF 10 MIN: CPT | Mod: ,,, | Performed by: ANESTHESIOLOGY

## 2019-02-16 PROCEDURE — 25000003 PHARM REV CODE 250: Performed by: PHYSICIAN ASSISTANT

## 2019-02-16 PROCEDURE — 85610 PROTHROMBIN TIME: CPT

## 2019-02-16 PROCEDURE — 99212 PR OFFICE/OUTPT VISIT, EST, LEVL II, 10-19 MIN: ICD-10-PCS | Mod: ,,, | Performed by: ANESTHESIOLOGY

## 2019-02-16 PROCEDURE — 25000003 PHARM REV CODE 250: Performed by: UROLOGY

## 2019-02-16 PROCEDURE — 97116 GAIT TRAINING THERAPY: CPT | Mod: 59

## 2019-02-16 PROCEDURE — 97110 THERAPEUTIC EXERCISES: CPT

## 2019-02-16 PROCEDURE — 94664 DEMO&/EVAL PT USE INHALER: CPT

## 2019-02-16 PROCEDURE — 25000242 PHARM REV CODE 250 ALT 637 W/ HCPCS: Performed by: UROLOGY

## 2019-02-16 PROCEDURE — 94640 AIRWAY INHALATION TREATMENT: CPT

## 2019-02-16 PROCEDURE — 94761 N-INVAS EAR/PLS OXIMETRY MLT: CPT

## 2019-02-16 RX ORDER — MUPIROCIN 20 MG/G
1 OINTMENT TOPICAL 2 TIMES DAILY
Status: DISCONTINUED | OUTPATIENT
Start: 2019-02-16 | End: 2019-02-17 | Stop reason: HOSPADM

## 2019-02-16 RX ORDER — POLYETHYLENE GLYCOL 3350 17 G/17G
17 POWDER, FOR SOLUTION ORAL DAILY
Status: DISCONTINUED | OUTPATIENT
Start: 2019-02-16 | End: 2019-02-17 | Stop reason: HOSPADM

## 2019-02-16 RX ORDER — CALCIUM CARBONATE 200(500)MG
500 TABLET,CHEWABLE ORAL ONCE
Status: DISCONTINUED | OUTPATIENT
Start: 2019-02-16 | End: 2019-02-17 | Stop reason: HOSPADM

## 2019-02-16 RX ADMIN — ALBUTEROL SULFATE 2.5 MG: 2.5 SOLUTION RESPIRATORY (INHALATION) at 07:02

## 2019-02-16 RX ADMIN — PREGABALIN 75 MG: 75 CAPSULE ORAL at 09:02

## 2019-02-16 RX ADMIN — ROPIVACAINE HYDROCHLORIDE 8 ML/HR: 2 INJECTION, SOLUTION EPIDURAL; INFILTRATION at 09:02

## 2019-02-16 RX ADMIN — BUDESONIDE 0.25 MG: 0.25 INHALANT RESPIRATORY (INHALATION) at 05:02

## 2019-02-16 RX ADMIN — ALBUTEROL SULFATE 2.5 MG: 2.5 SOLUTION RESPIRATORY (INHALATION) at 01:02

## 2019-02-16 RX ADMIN — BUDESONIDE 0.25 MG: 0.25 INHALANT RESPIRATORY (INHALATION) at 07:02

## 2019-02-16 RX ADMIN — WARFARIN SODIUM 7.5 MG: 2.5 TABLET ORAL at 06:02

## 2019-02-16 RX ADMIN — POLYETHYLENE GLYCOL 3350 17 G: 17 POWDER, FOR SOLUTION ORAL at 10:02

## 2019-02-16 RX ADMIN — ASPIRIN 81 MG: 81 TABLET, COATED ORAL at 10:02

## 2019-02-16 RX ADMIN — MUPIROCIN 1 G: 20 OINTMENT TOPICAL at 10:02

## 2019-02-16 RX ADMIN — ACETAMINOPHEN 1000 MG: 500 TABLET ORAL at 12:02

## 2019-02-16 RX ADMIN — MUPIROCIN 1 G: 20 OINTMENT TOPICAL at 05:02

## 2019-02-16 RX ADMIN — FAMOTIDINE 20 MG: 20 TABLET ORAL at 10:02

## 2019-02-16 RX ADMIN — OXYCODONE HYDROCHLORIDE 10 MG: 10 TABLET ORAL at 03:02

## 2019-02-16 RX ADMIN — STANDARDIZED SENNA CONCENTRATE AND DOCUSATE SODIUM 1 TABLET: 8.6; 5 TABLET, FILM COATED ORAL at 09:02

## 2019-02-16 RX ADMIN — STANDARDIZED SENNA CONCENTRATE AND DOCUSATE SODIUM 1 TABLET: 8.6; 5 TABLET, FILM COATED ORAL at 10:02

## 2019-02-16 RX ADMIN — ACETAMINOPHEN 1000 MG: 500 TABLET ORAL at 05:02

## 2019-02-16 RX ADMIN — MUPIROCIN 1 G: 20 OINTMENT TOPICAL at 09:02

## 2019-02-16 RX ADMIN — LOSARTAN POTASSIUM 25 MG: 25 TABLET, FILM COATED ORAL at 10:02

## 2019-02-16 NOTE — ANESTHESIA POST-OP PAIN MANAGEMENT
Acute Pain Service and Perioperative Surgical Home Progress Note    HPI  Mayur Roblero is a 84 y.o., male,     Interval history      Surgery:  Procedure(s) (LRB):  REPLACEMENT-KNEE-TOTAL (Right)    Post Op Day #: 1    Catheter type: Perineural Adductor Canal    Infusion type: Ropivacaine 0.2%  8 ml/hr basal    Problem List:    Active Hospital Problems    Diagnosis  POA    *Primary osteoarthritis of right knee [M17.11]  Yes      Resolved Hospital Problems   No resolved problems to display.       Subjective:       General appearance of alert, oriented, no complaints   Pain with rest: 1    Numbers   Pain with movement: 1    Numbers   Side Effects    1. Pruritis No    2. Nausea No    3. Motor Blockade No,     4. Sedation No, 1=awake and alert    Schedule Medications:    acetaminophen  1,000 mg Oral Q6H    albuterol sulfate  2.5 mg Nebulization Q6H    ARIPiprazole  4 mg Oral QHS    aspirin  81 mg Oral BID    budesonide  0.25 mg Nebulization Q12H    calcium carbonate  500 mg Oral Once    famotidine  20 mg Oral Daily    losartan  25 mg Oral Daily    mupirocin  1 g Nasal BID    polyethylene glycol  17 g Oral Daily    pregabalin  75 mg Oral QHS    rosuvastatin  10 mg Oral QHS    senna-docusate 8.6-50 mg  1 tablet Oral BID    warfarin  7.5 mg Oral Daily        Continuous Infusions:   sodium chloride 0.9% 100 mL/hr at 02/15/19 0935    ropivacaine (PF) 2 mg/ml (0.2%) 8 mL/hr (02/16/19 0907)    ropivacaine          PRN Medications:  bisacodyl, morphine, naloxone, ondansetron, oxyCODONE, oxyCODONE, promethazine (PHENERGAN) IVPB, ramelteon, ropivacaine, sodium chloride 0.9%       Antibiotics:  Antibiotics (From admission, onward)    Start     Stop Route Frequency Ordered    02/16/19 0054  mupirocin 2 % ointment 1 g      02/20 2059 Nasl 2 times daily 02/16/19 0054    02/15/19 1530  ceFAZolin injection 2 g      02/16 0729 IV Every 8 hours (non-standard times) 02/15/19 0909             Objective:     Catheter site  clean, dry, intact          Vital Signs (Most Recent):  Temp: 36.3 °C (97.4 °F) (02/16/19 0730)  Pulse: 66 (02/16/19 0736)  Resp: 18 (02/16/19 0736)  BP: (!) 150/70 (02/16/19 0730)  SpO2: 96 % (02/16/19 0730) Vital Signs Range (Last 24H):  Temp:  [35.6 °C (96.1 °F)-36.5 °C (97.7 °F)]   Pulse:  [63-75]   Resp:  [16-20]   BP: (110-162)/(57-70)   SpO2:  [91 %-97 %]          I & O (Last 24H):    Intake/Output Summary (Last 24 hours) at 2/16/2019 0911  Last data filed at 2/15/2019 1700  Gross per 24 hour   Intake --   Output 900 ml   Net -900 ml       Physical Exam:    GA: Alert, comfortable, no acute distress.   Pulmonary: Clear to auscultation A/P/L. No wheezing, crackles, or rhonchi.  Cardiac: RRR S1 & S2 w/o rubs/murmurs/gallops.   Abdominal:Bowel sounds present. No tenderness to palpation or distension. No appreciable hepatosplenomegaly.   Skin: No jaundice, rashes, or visible lesions.         Laboratory:  CBC: No results for input(s): WBC, RBC, HGB, HCT, PLT, MCV, MCH, MCHC in the last 72 hours.    BMP:   Recent Labs     02/15/19  0917   *   K 4.2   CO2 22*      BUN 11   CREATININE 1.2   *   CALCIUM 8.3*       Recent Labs     02/15/19  0926 02/16/19  0357   INR 1.2 1.1         Anticoagulant: ASA 81mg bid, Warfarin 7.5mg qd    Assessment:         Pain control adequate    Plan:     1) Pain:    Adductor canal perineural catheter in place and infusing. Good level. Multimodal pain regimen with acetaminophen, Lyrica, and prn oxycodone. Will continue to monitor. Plan to D/C perineural catheter in AM or home with On-Q if patient discharged today.   2) FEN/GI: Tolerating liquids, advance diet as tolerated.    3) Dispo: Plan for D/C tomorrow afternoon or possibly today if patient   works well with PT and meets goals.          Evaluator Sandip Pack MD          I have reviewed and concur with the resident's history, physical, assessment, and plan.  I have personally interviewed and examined the patient  at bedside.  See below addendum for my evaluation and additional findings.   Pain well controlled.  NO prn pain medications used.  VSS.  Tolerating diet this am without nausea.  Will follow up PT progress.  Possible d/c later today, however, pt is requesting and additional night in hospital.

## 2019-02-16 NOTE — PLAN OF CARE
Problem: Occupational Therapy Goal  Goal: Occupational Therapy Goal  Goals to be met by: 7 days    Patient will increase functional independence with ADLs by performing:    UE Dressing with Supervision.  LE Dressing with Supervision with AD as needed.  Grooming while standing with Supervision.  Toileting from bedside commode with Supervision for hygiene and clothing management.   Stand pivot transfers with Supervision.  Toilet transfer to bedside commode with Supervision.        Outcome: Outcome(s) achieved Date Met: 02/16/19  Pt has successfully achieved his functional outcomes for acute OT     Comments: D/c from acute OT

## 2019-02-16 NOTE — PLAN OF CARE
Problem: Physical Therapy Goal  Goal: Physical Therapy Goal  Goals to be met by: 19     Patient will increase functional independence with mobility by performin. Supine to sit with Set-up [place]  2. Sit to supine with Set-up [place]  3. Sit to stand transfer with Supervision  4. Bed to chair transfer with Stand-by Assistance using Rolling Walker  5. Gait  x 150 feet with Stand-by Assistance using Rolling Walker.   6. Ascend/Descend 7 inch curb step with Contact Guard Assistance using Rolling Walker.  7. Lower extremity exercise program x20-30 reps per handout, with independence       Outcome: Ongoing (interventions implemented as appropriate)  Patient continues to progress well towards his goals as evidence of walking range.

## 2019-02-16 NOTE — PT/OT/SLP PROGRESS
Physical Therapy Treatment    Patient Name:  Mayur Roblero   MRN:  [id number]    Recommendations:     Discharge Recommendations:  (HHPT)   Discharge Equipment Recommendations: commode, walker, rolling   Barriers to discharge: None    Assessment:     Mayur Roblero is a 84 y.o. male admitted with a medical diagnosis of Primary osteoarthritis of right knee.  He presents with the following impairments/functional limitations:  weakness, impaired endurance, impaired self care skills, impaired functional mobilty, gait instability, impaired balance, decreased ROM, edema, orthopedic precautions . Patient ambulates with decreased R LE step length, antalgic gait pattern and min fwd/flexed posture, but showed improved endurance. Increased respiration rate noted after gait training due to Emphysema.    Rehab Prognosis: Good; patient would benefit from acute skilled PT services to address these deficits and reach maximum level of function.    Recent Surgery: Procedure(s) (LRB):  REPLACEMENT-KNEE-TOTAL (Right) 1 Day Post-Op    Plan:     During this hospitalization, patient to be seen BID to address the identified rehab impairments via gait training, therapeutic activities, neuromuscular re-education and progress toward the following goals:    · Plan of Care Expires:  03/15/19    Subjective     Chief Complaint: fatigue  Patient/Family Comments/goals: to go home tomorrow.  Pain/Comfort:  · Pain Rating 1: 0/10  · Location - Side 1: Right  · Location - Orientation 1: generalized  · Location 1: knee  · Pain Addressed 1: Pre-medicate for activity  · Pain Rating Post-Intervention 1: 0/10      Objective:     Communicated with nsg prior to session.  Patient found all lines intact, call button in reach and spouse present FCD, perineural catheter, cryotherapy  upon PT entry to room.     General Precautions: Standard, fall   Orthopedic Precautions:RLE weight bearing as tolerated   Braces: N/A     Functional Mobility:  · Transfers:     · Sit to  Stand:  contact guard assistance with rolling walker  · Bed to Chair: contact guard assistance with  rolling walker  using  Stand Pivot  · Gait: ~80 ft with RW and CGA with chair follow and 2 short rest breaks in standing.      AM-PAC 6 CLICK MOBILITY  Turning over in bed (including adjusting bedclothes, sheets and blankets)?: 3  Sitting down on and standing up from a chair with arms (e.g., wheelchair, bedside commode, etc.): 3  Moving from lying on back to sitting on the side of the bed?: 3  Moving to and from a bed to a chair (including a wheelchair)?: 3  Need to walk in hospital room?: 3  Climbing 3-5 steps with a railing?: 3  Basic Mobility Total Score: 18       Therapeutic Activities and Exercises:   Doffed/Donned FCD's and polar ice. Educated on the importance of postural awareness during gait training. There ex: SAQ, HS, HIP FLEX, HEEL/TOE RAISES AND QUAD SETS 2X12 REPS.    Patient left up in chair with all lines intact, call button in reach and SPOUSE present..    GOALS:   Multidisciplinary Problems     Physical Therapy Goals        Problem: Physical Therapy Goal    Goal Priority Disciplines Outcome Goal Variances Interventions   Physical Therapy Goal     PT, PT/OT Ongoing (interventions implemented as appropriate)     Description:  Goals to be met by: 19     Patient will increase functional independence with mobility by performin. Supine to sit with Set-up [place]  2. Sit to supine with Set-up [place]  3. Sit to stand transfer with Supervision  4. Bed to chair transfer with Stand-by Assistance using Rolling Walker  5. Gait  x 150 feet with Stand-by Assistance using Rolling Walker.   6. Ascend/Descend 7 inch curb step with Contact Guard Assistance using Rolling Walker.  7. Lower extremity exercise program x20-30 reps per handout, with independence                        Time Tracking:     PT Received On: 19  PT Start Time: 1240     PT Stop Time: 1319  PT Total Time (min): 39 min      Billable Minutes: Gait Training 15, Therapeutic Activity 9 and Therapeutic Exercise 15    Treatment Type: Treatment  PT/PTA: PTA     PTA Visit Number: 1     Jonathan Menjivar PTA  02/16/2019

## 2019-02-16 NOTE — ADDENDUM NOTE
Addendum  created 02/16/19 1049 by Zan Tapia MD    Charge Capture section accepted, Sign clinical note

## 2019-02-16 NOTE — PT/OT/SLP PROGRESS
Occupational Therapy   Treatment/ Discharge Summary    Name: Mayur Roblero  MRN: [id number]  Admitting Diagnosis:  Primary osteoarthritis of right knee  1 Day Post-Op    Recommendations:     Discharge Recommendations: (home w/ HH )  Discharge Equipment Recommendations:  walker, rolling, commode  Barriers to discharge:  None    Assessment:     Mayur Roblero is a 84 y.o. male with a medical diagnosis of Primary osteoarthritis of right knee.  He presents with no further acute OT needs at this time. Pt is performing is self-care and functional mobility tasks safely and appropriately s/p R TKA. Pt observed to get SOB w/ exertion 2/2 emphysema. Pt will benefit from further therapy w/ HH services to maximize his functional independence and safety w/in his home environment.      Rehab Prognosis:  Good; patient would benefit from acute skilled OT services to address these deficits and reach maximum level of function.       Plan:     ·  (d/c from acute OT - please reconsult if anything changes )   · Plan of Care Expires:    · Plan of Care Reviewed with: patient, spouse    Subjective     Pain/Comfort:  · Pain Rating 1: 0/10  · Pain Rating Post-Intervention 1: 0/10    Objective:     Communicated with: RN prior to session.  Patient found HOB elevated with perineural catheter, cryotherapy, FCD upon OT entry to room.    General Precautions: Standard, fall   Orthopedic Precautions:RLE weight bearing as tolerated   Braces: N/A     Occupational Performance:     Bed Mobility:    · Patient completed Scooting/Bridging with stand by assistance  · Patient completed Supine to Sit with stand by assistance     Functional Mobility/Transfers:  · Patient completed Sit <> Stand Transfer with stand by assistance  with  rolling walker   · Patient completed Toilet Transfer Step Transfer technique with stand by assistance with  rolling walker and grab bars  · Functional Mobility: Pt ambulated household distance w/ SBA and RW for increased stability and  safety. Pt demonstrated good understanding of proper gait sequence and RW management. No LOB.    Activities of Daily Living:  · Upper Body Dressing: independence donning overhead shirt sitting EOB  · Lower Body Dressing: stand by assistance and verbal cues for adaptive technique of donning underwear and pants  · Toileting: simulated SBA for clothing management       [facility] 6 Click ADL: 23    Treatment & Education:  - OT POC  - Importance of OOB activity to maximize recovery   - Reviewed proper gait sequence forward/backward and RW management  - Safety w/ functional mobility; hand placement to ensure safe transfers to various surfaces  - LBD adaptive technique     Patient left up in chair with all lines intact, call button in reach, RN notified and spouse presentEducation:      GOALS:   Multidisciplinary Problems     Occupational Therapy Goals     Not on file          Multidisciplinary Problems (Resolved)        Problem: Occupational Therapy Goal    Goal Priority Disciplines Outcome Interventions   Occupational Therapy Goal   (Resolved)     OT, PT/OT Outcome(s) achieved    Description:  Goals to be met by: 7 days    Patient will increase functional independence with ADLs by performing:    UE Dressing with Supervision.  LE Dressing with Supervision with AD as needed.  Grooming while standing with Supervision.  Toileting from bedside commode with Supervision for hygiene and clothing management.   Stand pivot transfers with Supervision.  Toilet transfer to bedside commode with Supervision.                         Time Tracking:     OT Date of Treatment: 02/16/19  OT Start Time: 0946  OT Stop Time: 1011  OT Total Time (min): 25 min    Billable Minutes:Self Care/Home Management 15  Therapeutic Activity 8    Alexandra Shane, OT  2/16/2019

## 2019-02-16 NOTE — SUBJECTIVE & OBJECTIVE
Principal Problem:Primary osteoarthritis of right knee    Principal Orthopedic Problem: same    Interval History: Patient seen and examined at bedside.  No acute events overnight.  Pain controlled.  Walked 3 feet with PT yesterday.      Review of patient's allergies indicates:   Allergen Reactions    Dilaudid [hydromorphone] Nausea And Vomiting    Keflex [cephalexin] Hives    Morphine Anaphylaxis     Patient's wife states it caused an ileus.  Hospitalized for 9 days    Moxifloxacin Other (See Comments)     Fever  Fever    Tiotropium bromide Anaphylaxis    Ondansetron Other (See Comments)     Vomiting blood    Codeine Other (See Comments)     unknown       Current Facility-Administered Medications   Medication    0.9%  NaCl infusion    acetaminophen tablet 1,000 mg    albuterol sulfate nebulizer solution 2.5 mg    ARIPiprazole tablet 4 mg    aspirin EC tablet 81 mg    bisacodyl suppository 10 mg    budesonide nebulizer solution 0.25 mg    ceFAZolin injection 2 g    famotidine tablet 20 mg    losartan tablet 25 mg    morphine injection 2 mg    mupirocin 2 % ointment 1 g    naloxone 0.4 mg/mL injection 0.02 mg    ondansetron injection 4 mg    oxyCODONE immediate release tablet 5 mg    oxyCODONE immediate release tablet Tab 10 mg    polyethylene glycol packet 17 g    pregabalin capsule 75 mg    promethazine (PHENERGAN) 6.25 mg in dextrose 5 % 50 mL IVPB    ramelteon tablet 8 mg    ropivacaine (PF) 2 mg/ml (0.2%) infusion    ropivacaine 0.2% ON-Q C-BLOC 400 ML (SELECT A FLOW)    rosuvastatin tablet 10 mg    senna-docusate 8.6-50 mg per tablet 1 tablet    sodium chloride 0.9% flush 3 mL    warfarin tablet 7.5 mg     Objective:     Vital Signs (Most Recent):  Temp: 96.1 °F (35.6 °C) (02/16/19 0506)  Pulse: 75 (02/16/19 0506)  Resp: 18 (02/16/19 0506)  BP: 133/61 (02/16/19 0506)  SpO2: 96 % (02/16/19 0506) Vital Signs (24h Range):  Temp:  [96.1 °F (35.6 °C)-97.7 °F (36.5 °C)] 96.1 °F (35.6  "°C)  Pulse:  [63-87] 75  Resp:  [16-28] 18  SpO2:  [91 %-97 %] 96 %  BP: (110-162)/(53-69) 133/61     Weight: 88.9 kg (196 lb)  Height: 5' 10" (177.8 cm)  Body mass index is 28.12 kg/m².      Intake/Output Summary (Last 24 hours) at 2/16/2019 0632  Last data filed at 2/15/2019 1700  Gross per 24 hour   Intake 2000 ml   Output 900 ml   Net 1100 ml       Ortho/SPM Exam  AAOx4  NAD  RRR  No increased WOB  Aquacel c/d/i  Polar ice in place  SILT T/SP/DP/Dinh/Sa  Motor intact T/SP/DP  WWP extremities  FCDs in place and functioning    Significant Labs:   BMP:   Recent Labs   Lab 02/15/19  0917   *   *   K 4.2      CO2 22*   BUN 11   CREATININE 1.2   CALCIUM 8.3*     All pertinent labs within the past 24 hours have been reviewed.    Significant Imaging: I have reviewed all pertinent imaging results/findings.  "

## 2019-02-16 NOTE — PROGRESS NOTES
[facility]  Orthopedics  Progress Note    Patient Name: Mayur Roblero  MRN: [id number]  Admission Date: 2/15/2019  Hospital Length of Stay: 1 days  Attending Provider: [facility]., MD  Primary Care Provider: Primary Doctor No  Follow-up For: Procedure(s) (LRB):  REPLACEMENT-KNEE-TOTAL (Right)    Post-Operative Day: 1 Day Post-Op  Subjective:     Principal Problem:Primary osteoarthritis of right knee    Principal Orthopedic Problem: same    Interval History: Patient seen and examined at bedside.  No acute events overnight.  Pain controlled.  Walked 3 feet with PT yesterday.      Review of patient's allergies indicates:   Allergen Reactions    Dilaudid [hydromorphone] Nausea And Vomiting    Keflex [cephalexin] Hives    Morphine Anaphylaxis     Patient's wife states it caused an ileus.  Hospitalized for 9 days    Moxifloxacin Other (See Comments)     Fever  Fever    Tiotropium bromide Anaphylaxis    Ondansetron Other (See Comments)     Vomiting blood    Codeine Other (See Comments)     unknown       Current Facility-Administered Medications   Medication    0.9%  NaCl infusion    acetaminophen tablet 1,000 mg    albuterol sulfate nebulizer solution 2.5 mg    ARIPiprazole tablet 4 mg    aspirin EC tablet 81 mg    bisacodyl suppository 10 mg    budesonide nebulizer solution 0.25 mg    ceFAZolin injection 2 g    famotidine tablet 20 mg    losartan tablet 25 mg    morphine injection 2 mg    mupirocin 2 % ointment 1 g    naloxone 0.4 mg/mL injection 0.02 mg    ondansetron injection 4 mg    oxyCODONE immediate release tablet 5 mg    oxyCODONE immediate release tablet Tab 10 mg    polyethylene glycol packet 17 g    pregabalin capsule 75 mg    promethazine (PHENERGAN) 6.25 mg in dextrose 5 % 50 mL IVPB    ramelteon tablet 8 mg    ropivacaine (PF) 2 mg/ml (0.2%) infusion    ropivacaine 0.2% ON-Q C-BLOC 400 ML (SELECT A FLOW)    rosuvastatin tablet 10 mg    senna-docusate  "8.6-50 mg per tablet 1 tablet    sodium chloride 0.9% flush 3 mL    warfarin tablet 7.5 mg     Objective:     Vital Signs (Most Recent):  Temp: 96.1 °F (35.6 °C) (02/16/19 0506)  Pulse: 75 (02/16/19 0506)  Resp: 18 (02/16/19 0506)  BP: 133/61 (02/16/19 0506)  SpO2: 96 % (02/16/19 0506) Vital Signs (24h Range):  Temp:  [96.1 °F (35.6 °C)-97.7 °F (36.5 °C)] 96.1 °F (35.6 °C)  Pulse:  [63-87] 75  Resp:  [16-28] 18  SpO2:  [91 %-97 %] 96 %  BP: (110-162)/(53-69) 133/61     Weight: 88.9 kg (196 lb)  Height: 5' 10" (177.8 cm)  Body mass index is 28.12 kg/m².      Intake/Output Summary (Last 24 hours) at 2/16/2019 0632  Last data filed at 2/15/2019 1700  Gross per 24 hour   Intake 2000 ml   Output 900 ml   Net 1100 ml       Ortho/SPM Exam  AAOx4  NAD  RRR  No increased WOB  Aquacel c/d/i  Polar ice in place  SILT T/SP/DP/Dinh/Sa  Motor intact T/SP/DP  WWP extremities  FCDs in place and functioning    Significant Labs:   BMP:   Recent Labs   Lab 02/15/19  0917   *   *   K 4.2      CO2 22*   BUN 11   CREATININE 1.2   CALCIUM 8.3*     All pertinent labs within the past 24 hours have been reviewed.    Significant Imaging: I have reviewed all pertinent imaging results/findings.    Assessment/Plan:     * Primary osteoarthritis of right knee    84 y.o. male POD1 s/p Right TKA    Pain control: multimodal  PT/OT: WBAT RLE  DVT PPx: ASA 81 BID and coumadin.  FCDs at all times when not ambulating  Abx: postop Ancef/vanc  Labs: calcium 8.3, INR 1.1. tums given  Drain: none  Estrada: none    Dispo: f/u PT recs, patient wishes to stay one extra day.              Liborio Valencia MD  Orthopedics  [facility]  "

## 2019-02-16 NOTE — ANESTHESIA POSTPROCEDURE EVALUATION
"Anesthesia Post Evaluation    Patient: Mayur Roblero    Procedure(s) Performed: Procedure(s) (LRB):  REPLACEMENT-KNEE-TOTAL (Right)    Final Anesthesia Type: spinal (spinal)  Patient location during evaluation: floor  Patient participation: Yes- Able to Participate  Level of consciousness: awake and alert  Post-procedure vital signs: reviewed and stable  Pain management: adequate  Airway patency: patent  PONV status at discharge: No PONV  Anesthetic complications: no      Cardiovascular status: blood pressure returned to baseline  Respiratory status: spontaneous ventilation, unassisted and room air  Hydration status: euvolemic  Follow-up not needed.        Visit Vitals  BP (!) 150/70 (Patient Position: Lying)   Pulse 66   Temp 36.3 °C (97.4 °F) (Oral)   Resp 18   Ht 5' 10" (1.778 m)   Wt 88.9 kg (196 lb)   SpO2 96%   BMI 28.12 kg/m²       Pain/Misty Score: Pain Rating Prior to Med Admin: 1 (2/16/2019  9:07 AM)  Pain Rating Post Med Admin: 2 (2/15/2019 11:00 AM)  Misty Score: 9 (2/15/2019 11:00 AM)        "

## 2019-02-17 VITALS
HEIGHT: 70 IN | DIASTOLIC BLOOD PRESSURE: 63 MMHG | RESPIRATION RATE: 18 BRPM | SYSTOLIC BLOOD PRESSURE: 137 MMHG | BODY MASS INDEX: 28.06 KG/M2 | OXYGEN SATURATION: 93 % | HEART RATE: 92 BPM | TEMPERATURE: 98 F | WEIGHT: 196 LBS

## 2019-02-17 LAB
INR PPP: 1.5
PROTHROMBIN TIME: 14.5 SEC

## 2019-02-17 PROCEDURE — 99212 OFFICE O/P EST SF 10 MIN: CPT | Mod: ,,, | Performed by: ANESTHESIOLOGY

## 2019-02-17 PROCEDURE — 25000003 PHARM REV CODE 250: Performed by: UROLOGY

## 2019-02-17 PROCEDURE — 94761 N-INVAS EAR/PLS OXIMETRY MLT: CPT

## 2019-02-17 PROCEDURE — 99900035 HC TECH TIME PER 15 MIN (STAT)

## 2019-02-17 PROCEDURE — 25000003 PHARM REV CODE 250: Performed by: PHYSICIAN ASSISTANT

## 2019-02-17 PROCEDURE — 99212 PR OFFICE/OUTPT VISIT, EST, LEVL II, 10-19 MIN: ICD-10-PCS | Mod: ,,, | Performed by: ANESTHESIOLOGY

## 2019-02-17 PROCEDURE — 27000646 HC AEROBIKA DEVICE

## 2019-02-17 PROCEDURE — 94664 DEMO&/EVAL PT USE INHALER: CPT

## 2019-02-17 PROCEDURE — 25000242 PHARM REV CODE 250 ALT 637 W/ HCPCS: Performed by: UROLOGY

## 2019-02-17 PROCEDURE — 94640 AIRWAY INHALATION TREATMENT: CPT

## 2019-02-17 PROCEDURE — 85610 PROTHROMBIN TIME: CPT

## 2019-02-17 RX ADMIN — ACETAMINOPHEN 1000 MG: 500 TABLET ORAL at 05:02

## 2019-02-17 RX ADMIN — FAMOTIDINE 20 MG: 20 TABLET ORAL at 08:02

## 2019-02-17 RX ADMIN — BUDESONIDE 0.25 MG: 0.25 INHALANT RESPIRATORY (INHALATION) at 08:02

## 2019-02-17 RX ADMIN — POLYETHYLENE GLYCOL 3350 17 G: 17 POWDER, FOR SOLUTION ORAL at 08:02

## 2019-02-17 RX ADMIN — OXYCODONE HYDROCHLORIDE 10 MG: 10 TABLET ORAL at 07:02

## 2019-02-17 RX ADMIN — LOSARTAN POTASSIUM 25 MG: 25 TABLET, FILM COATED ORAL at 08:02

## 2019-02-17 RX ADMIN — ASPIRIN 81 MG: 81 TABLET, COATED ORAL at 08:02

## 2019-02-17 RX ADMIN — ACETAMINOPHEN 1000 MG: 500 TABLET ORAL at 12:02

## 2019-02-17 RX ADMIN — OXYCODONE HYDROCHLORIDE 10 MG: 10 TABLET ORAL at 01:02

## 2019-02-17 RX ADMIN — STANDARDIZED SENNA CONCENTRATE AND DOCUSATE SODIUM 1 TABLET: 8.6; 5 TABLET, FILM COATED ORAL at 08:02

## 2019-02-17 NOTE — ADDENDUM NOTE
Addendum  created 02/17/19 1441 by Zan Tapia MD    Charge Capture section accepted, Cosign clinical note with attestation

## 2019-02-17 NOTE — PLAN OF CARE
CARMEN received call from Ortho service - pt is ready to d/c today w/ h/h and DME. CM verified pt's contact info and choices. Pt stated that he is expected to d/c w/ [facility] - CARMEN spoke w/ [facility] on-call and pt is on list and will be admitted tmw. CM forwarded h/h orders to [facility]. Pt w/ recs for RW and BSC - pt stated that he wants the RW but declines the BSC. CARMEN forwarded RW order to [facility] attn: Camacho johns [phone] - spoke sacha/ Camacho and he will deliver the RW to pt's room. Pt will be ready to d/c after walker arrives to bedside.

## 2019-02-17 NOTE — ANESTHESIA POST-OP PAIN MANAGEMENT
Acute Pain Service and Perioperative Surgical Home Progress Note    HPI  Mayur Roblero is a 84 y.o., male,     Interval history      Surgery:  Procedure(s) (LRB):  REPLACEMENT-KNEE-TOTAL (Right)    Post Op Day #: 2    Catheter type: Perineural Adductor Canal    Infusion type: Ropivacaine 0.2%  8 ml/hr basal    Problem List:    Active Hospital Problems    Diagnosis  POA    *Primary osteoarthritis of right knee [M17.11]  Yes      Resolved Hospital Problems   No resolved problems to display.       Subjective:       General appearance of alert, oriented, no complaints   Pain with rest: 1    Numbers   Pain with movement: 1    Numbers   Side Effects    1. Pruritis No    2. Nausea No    3. Motor Blockade No,     4. Sedation No, 1=awake and alert    Schedule Medications:    acetaminophen  1,000 mg Oral Q6H    ARIPiprazole  4 mg Oral QHS    aspirin  81 mg Oral BID    budesonide  0.25 mg Nebulization Q12H    calcium carbonate  500 mg Oral Once    famotidine  20 mg Oral Daily    losartan  25 mg Oral Daily    mupirocin  1 g Nasal BID    polyethylene glycol  17 g Oral Daily    pregabalin  75 mg Oral QHS    rosuvastatin  10 mg Oral QHS    senna-docusate 8.6-50 mg  1 tablet Oral BID    warfarin  7.5 mg Oral Daily        Continuous Infusions:   ropivacaine (PF) 2 mg/ml (0.2%) 8 mL/hr (02/16/19 2123)    ropivacaine          PRN Medications:  bisacodyl, morphine, naloxone, ondansetron, oxyCODONE, oxyCODONE, promethazine (PHENERGAN) IVPB, ramelteon, ropivacaine, sodium chloride 0.9%       Antibiotics:  Antibiotics (From admission, onward)    Start     Stop Route Frequency Ordered    02/16/19 0054  mupirocin 2 % ointment 1 g      02/20 2059 Nasl 2 times daily 02/16/19 0054    02/15/19 1530  ceFAZolin injection 2 g      02/16 0729 IV Every 8 hours (non-standard times) 02/15/19 0909             Objective:     Catheter site clean, dry, intact          Vital Signs (Most Recent):  Temp: 36.4 °C (97.6 °F) (02/17/19  0853)  Pulse: 92 (02/17/19 0853)  Resp: 18 (02/17/19 0853)  BP: 137/63 (02/17/19 0853)  SpO2: (!) 93 % (02/17/19 0853) Vital Signs Range (Last 24H):  Temp:  [36.1 °C (97 °F)-37.6 °C (99.7 °F)]   Pulse:  [68-92]   Resp:  [18-19]   BP: (130-173)/(63-74)   SpO2:  [93 %-97 %]          I & O (Last 24H):  No intake or output data in the 24 hours ending 02/17/19 1056    Physical Exam:    GA: Alert, comfortable, no acute distress.   Pulmonary: Clear to auscultation A/P/L. No wheezing, crackles, or rhonchi.  Cardiac: RRR S1 & S2 w/o rubs/murmurs/gallops.   Abdominal:Bowel sounds present. No tenderness to palpation or distension. No appreciable hepatosplenomegaly.   Skin: No jaundice, rashes, or visible lesions.         Laboratory:  CBC: No results for input(s): WBC, RBC, HGB, HCT, PLT, MCV, MCH, MCHC in the last 72 hours.    BMP:   Recent Labs     02/15/19  0917   *   K 4.2   CO2 22*      BUN 11   CREATININE 1.2   *   CALCIUM 8.3*       Recent Labs     02/15/19  0926 02/16/19  0357 02/17/19  0522   INR 1.2 1.1 1.5*         Anticoagulant: ASA 81mg bid, Warfarin 7.5mg qd    Assessment:     Pain control adequate    Plan:     1) Pain:  PNC stopped this am. Pain adequately controlled on re-evaluation after PNC stopped. Pulled PNC.    2) FEN/GI: Tolerating liquids, advance diet as tolerated.    3) Dispo: D/C today    Will sign off.     Evaluator Darren Chatterjee MD

## 2019-02-17 NOTE — SUBJECTIVE & OBJECTIVE
Principal Problem:Primary osteoarthritis of right knee    Principal Orthopedic Problem: same    Interval History: Patient seen and examined at bedside.  No acute events overnight.  Pain controlled.  Walked 3 feet with PT yesterday.      Review of patient's allergies indicates:   Allergen Reactions    Dilaudid [hydromorphone] Nausea And Vomiting    Keflex [cephalexin] Hives    Morphine Anaphylaxis     Patient's wife states it caused an ileus.  Hospitalized for 9 days    Moxifloxacin Other (See Comments)     Fever  Fever    Tiotropium bromide Anaphylaxis    Ondansetron Other (See Comments)     Vomiting blood    Codeine Other (See Comments)     unknown       Current Facility-Administered Medications   Medication    acetaminophen tablet 1,000 mg    ARIPiprazole tablet 4 mg    aspirin EC tablet 81 mg    bisacodyl suppository 10 mg    budesonide nebulizer solution 0.25 mg    calcium carbonate 200 mg calcium (500 mg) chewable tablet 500 mg    famotidine tablet 20 mg    losartan tablet 25 mg    morphine injection 2 mg    mupirocin 2 % ointment 1 g    naloxone 0.4 mg/mL injection 0.02 mg    ondansetron injection 4 mg    oxyCODONE immediate release tablet 5 mg    oxyCODONE immediate release tablet Tab 10 mg    polyethylene glycol packet 17 g    pregabalin capsule 75 mg    promethazine (PHENERGAN) 6.25 mg in dextrose 5 % 50 mL IVPB    ramelteon tablet 8 mg    ropivacaine (PF) 2 mg/ml (0.2%) infusion    ropivacaine 0.2% ON-Q C-BLOC 400 ML (SELECT A FLOW)    rosuvastatin tablet 10 mg    senna-docusate 8.6-50 mg per tablet 1 tablet    sodium chloride 0.9% flush 3 mL    warfarin tablet 7.5 mg     Objective:     Vital Signs (Most Recent):  Temp: 98.9 °F (37.2 °C) (02/17/19 0505)  Pulse: 73 (02/17/19 0505)  Resp: 18 (02/17/19 0505)  BP: (!) 173/70 (02/17/19 0505)  SpO2: (!) 94 % (02/17/19 0505) Vital Signs (24h Range):  Temp:  [97 °F (36.1 °C)-99.7 °F (37.6 °C)] 98.9 °F (37.2 °C)  Pulse:  [66-83]  "73  Resp:  [16-19] 18  SpO2:  [93 %-97 %] 94 %  BP: (130-173)/(70-74) 173/70     Weight: 88.9 kg (196 lb)  Height: 5' 10" (177.8 cm)  Body mass index is 28.12 kg/m².    No intake or output data in the 24 hours ending 02/17/19 0703    Ortho/SPM Exam    AAOx4  NAD  RRR  No increased WOB  Aquacel c/d/i  Polar ice in place  SILT T/SP/DP/Dinh/Sa  Motor intact T/SP/DP  WWP extremities  FCDs in place and functioning    Significant Labs:   BMP:   Recent Labs   Lab 02/15/19  0917   *   *   K 4.2      CO2 22*   BUN 11   CREATININE 1.2   CALCIUM 8.3*     All pertinent labs within the past 24 hours have been reviewed.    Significant Imaging: I have reviewed all pertinent imaging results/findings.  "

## 2019-02-17 NOTE — PROGRESS NOTES
[facility]  Orthopedics  Progress Note    Patient Name: Mayur Roblero  MRN: [id number]  Admission Date: 2/15/2019  Hospital Length of Stay: 1 days  Attending Provider: [facility] Jr., MD  Primary Care Provider: Primary Doctor No  Follow-up For: Procedure(s) (LRB):  REPLACEMENT-KNEE-TOTAL (Right)    Post-Operative Day: 2 Days Post-Op  Subjective:     Principal Problem:Primary osteoarthritis of right knee    Principal Orthopedic Problem: same    Interval History: Patient seen and examined at bedside.  No acute events overnight.  Pain controlled.  Walked 80 feet with PT yesterday.       Review of patient's allergies indicates:   Allergen Reactions    Dilaudid [hydromorphone] Nausea And Vomiting    Keflex [cephalexin] Hives    Morphine Anaphylaxis     Patient's wife states it caused an ileus.  Hospitalized for 9 days    Moxifloxacin Other (See Comments)     Fever  Fever    Tiotropium bromide Anaphylaxis    Ondansetron Other (See Comments)     Vomiting blood    Codeine Other (See Comments)     unknown       Current Facility-Administered Medications   Medication    acetaminophen tablet 1,000 mg    ARIPiprazole tablet 4 mg    aspirin EC tablet 81 mg    bisacodyl suppository 10 mg    budesonide nebulizer solution 0.25 mg    calcium carbonate 200 mg calcium (500 mg) chewable tablet 500 mg    famotidine tablet 20 mg    losartan tablet 25 mg    morphine injection 2 mg    mupirocin 2 % ointment 1 g    naloxone 0.4 mg/mL injection 0.02 mg    ondansetron injection 4 mg    oxyCODONE immediate release tablet 5 mg    oxyCODONE immediate release tablet Tab 10 mg    polyethylene glycol packet 17 g    pregabalin capsule 75 mg    promethazine (PHENERGAN) 6.25 mg in dextrose 5 % 50 mL IVPB    ramelteon tablet 8 mg    ropivacaine (PF) 2 mg/ml (0.2%) infusion    ropivacaine 0.2% ON-Q C-BLOC 400 ML (SELECT A FLOW)    rosuvastatin tablet 10 mg    senna-docusate 8.6-50 mg per tablet 1 tablet  "   sodium chloride 0.9% flush 3 mL    warfarin tablet 7.5 mg     Objective:     Vital Signs (Most Recent):  Temp: 98.9 °F (37.2 °C) (02/17/19 0505)  Pulse: 73 (02/17/19 0505)  Resp: 18 (02/17/19 0505)  BP: (!) 173/70 (02/17/19 0505)  SpO2: (!) 94 % (02/17/19 0505) Vital Signs (24h Range):  Temp:  [97 °F (36.1 °C)-99.7 °F (37.6 °C)] 98.9 °F (37.2 °C)  Pulse:  [66-83] 73  Resp:  [16-19] 18  SpO2:  [93 %-97 %] 94 %  BP: (130-173)/(70-74) 173/70     Weight: 88.9 kg (196 lb)  Height: 5' 10" (177.8 cm)  Body mass index is 28.12 kg/m².    No intake or output data in the 24 hours ending 02/17/19 0703    Ortho/SPM Exam    AAOx4  NAD  RRR  No increased WOB  Aquacel c/d/i  Polar ice in place  SILT T/SP/DP/Dinh/Sa  Motor intact T/SP/DP  WWP extremities  FCDs in place and functioning    Significant Labs:   BMP:   Recent Labs   Lab 02/15/19  0917   *   *   K 4.2      CO2 22*   BUN 11   CREATININE 1.2   CALCIUM 8.3*     All pertinent labs within the past 24 hours have been reviewed.    Significant Imaging: I have reviewed all pertinent imaging results/findings.    Assessment/Plan:     * Primary osteoarthritis of right knee    84 y.o. male POD1 s/p Right TKA    Pain control: multimodal  PT/OT: WBAT RLE  DVT PPx: ASA 81 BID and coumadin INR 1.5.  FCDs at all times when not ambulating  Abx: postop Ancef/vanc complete  Labs: none  Drain: none  Estrada: none    Dispo: f/u PT recs, DC today.              Liborio Valencia MD  Orthopedics  [facility]  "

## 2019-02-17 NOTE — DISCHARGE SUMMARY
[facility]  Orthopedics  Discharge Summary      Patient Name: Mayur Roblero  MRN: [id number]  Admission Date: 2/15/2019  Hospital Length of Stay: 1 days  Discharge Date and Time: 2/17/2019  1:51 PM  Attending Physician: No att. providers found   Discharging Provider: Liborio Valencia MD  Primary Care Provider: Gm Spencer MD    HPI: Mayur Roblero is a 84 y.o. male with 2 year history of Right knee pain. Pain is worse with activity and weight bearing.  Patient has experienced interference of activities of daily living due to decreased range of motion and an increase in joint pain and swelling.  Patient has failed non-operative treatment including NSAIDs, corticosteroid injections, viscosupplement injections, and activity modification.  Mayur Roblero currently ambulates independently. Had L TKA by Dr. [facility] in 2011.      Relevant medical conditions of significance in perioperative period:  CAD: s/p stents in 2008, on ASA and Plavix  COPD: uses Pulmicort bid        Procedure(s) (LRB):  REPLACEMENT-KNEE-TOTAL (Right)      Hospital Course: the patient arrived to pre-op area for proper pre-operative management.  Upon completion of pre-operative preparation, the patient was taken back to the operative theatre.  A Right TKA was performed without complication and the patient was transported to the post anesthesia care unit in stable condition. After appropriate recovery from the anaesthetic agents used during the surgery the patient was transferred to the floor where they received pain medication and physical therapy. The patient did not feel ready to go home The following day, thus on post op day 2, when the patient was deemed safe for DC, they were discharged home.          Consults (From admission, onward)        Status Ordering Provider     Inpatient consult to Pain Management  Once     Provider:  (Not yet assigned)    Acknowledged DEBORAH SPEAR     Inpatient consult to Respiratory Care  Once   "   Provider:  (Not yet assigned)    Acknowledged DEBORAH SPEAR     Inpatient consult to Social Work  Once     Provider:  (Not yet assigned)    Acknowledged DEBORAH SPEAR          Significant Diagnostic Studies: Labs: CMP No results for input(s): NA, K, CL, CO2, GLU, BUN, CREATININE, CALCIUM, PROT, ALBUMIN, BILITOT, ALKPHOS, AST, ALT, ANIONGAP, ESTGFRAFRICA, EGFRNONAA in the last 48 hours. and CBC No results for input(s): WBC, HGB, HCT, PLT in the last 48 hours.    Pending Diagnostic Studies:     None        Final Active Diagnoses:    Diagnosis Date Noted POA    PRINCIPAL PROBLEM:  Primary osteoarthritis of right knee [M17.11] 02/15/2019 Yes      Problems Resolved During this Admission:      Discharged Condition: stable    Disposition: Home or Self Care    Follow Up:  Follow-up Information     Gm Spencer MD.    Specialty:  Family Medicine  Why:  As needed  Contact information:  [facility] [id number] [phone]             [facility] On 2/18/2019.    Specialty:  Home Health Services  Why:  Home Health nurse will admit patient Monday 2/18  Contact information:  [address] [facility] [id number]  [phone]                 Patient Instructions:      WALKER FOR HOME USE     Order Specific Question Answer Comments   Type of Walker: Adult (5'4"-6'6")    With wheels? Yes    Height: 5' 10" (1.778 m)    Weight: 88.9 kg (196 lb)    Length of need (1-99 months): 99    Does patient have medical equipment at home? shower chair    Please check all that apply: Patient's condition impairs ambulation.    Please check all that apply: Walker will be used for gait training.      Ambulatory consult to Anticoagulation Monitoring   Referral Priority: Routine Referral Type: Consultation   Referral Reason: Specialty Services Required   Requested Specialty: Cardiology   Number of Visits Requested: 1     Medications:  Reconciled Home Medications:      Medication List    "   START taking these medications    famotidine 20 MG tablet  Commonly known as:  PEPCID  Take 1 tablet (20 mg total) by mouth 2 (two) times daily.     oxyCODONE-acetaminophen  mg per tablet  Commonly known as:  PERCOCET  Take 1 tablet by mouth every 4 (four) hours as needed for Pain.     polyethylene glycol 17 gram/dose powder  Commonly known as:  GLYCOLAX  Mix 1 capful (17 g) with liquid and take by mouth once daily.     promethazine 12.5 MG Tab  Commonly known as:  PHENERGAN  Take 1 tablet (12.5 mg total) by mouth every 6 (six) hours as needed.     STOOL SOFTENER 100 MG capsule  Generic drug:  docusate sodium  Take 1 capsule (100 mg total) by mouth 3 (three) times daily.     warfarin 2.5 MG tablet  Commonly known as:  COUMADIN  Take 1 tablet (2.5 mg total) by mouth Daily. Take 1 tablet daily unless directed otherwise by Coumadin Clinic.        CHANGE how you take these medications    aspirin 81 MG EC tablet  Commonly known as:  ECOTRIN  Take 1 tablet (81 mg total) by mouth 2 (two) times daily.  What changed:  when to take this     clopidogrel 75 mg tablet  Commonly known as:  PLAVIX  Take 1 tablet (75 mg total) by mouth once daily.  Start taking on:  3/1/2019  What changed:  These instructions start on 3/1/2019. If you are unsure what to do until then, ask your doctor or other care provider.        CONTINUE taking these medications    ARIPiprazole 2 MG Tab  Commonly known as:  ABILIFY  TAKE 2 TABLETS EVERY DAY     * budesonide 180mcg 180 mcg/actuation Aepb  Commonly known as:  PULMICORT FLEXHALER  Inhale 2 puffs into the lungs 2 (two) times daily.     * PULMICORT FLEXHALER 180 mcg/actuation Aepb  Generic drug:  budesonide 180mcg  USE 2 PUFFS 2 TIMES DAILY AS DIRECTED     * CRESTOR 10 MG tablet  Generic drug:  rosuvastatin  TAKE 1 TABLET BY MOUTH EVERY DAY     * CRESTOR 10 MG tablet  Generic drug:  rosuvastatin  TAKE 1 TABLET BY MOUTH EVERY DAY     DULoxetine 30 MG capsule  Commonly known as:  CYMBALTA  Take  1 capsule (30 mg total) by mouth once daily.     EDARBI 40 mg Tab  Generic drug:  azilsartan medoxomil  Take 20 mg by mouth once daily.     levalbuterol 45 mcg/actuation inhaler  Commonly known as:  XOPENEX HFA  Inhale 2 puffs into the lungs every 6 (six) hours as needed for Wheezing or Shortness of Breath.     OMEGA 3 ORAL  Take 1,000 mg by mouth once daily.     PRESERVISION AREDS ORAL  Take by mouth.         * This list has 4 medication(s) that are the same as other medications prescribed for you. Read the directions carefully, and ask your doctor or other care provider to review them with you.                Liborio Valencia MD  Orthopedics  [facility]

## 2019-02-18 ENCOUNTER — TELEPHONE (OUTPATIENT)
Dept: ORTHOPEDICS | Facility: CLINIC | Age: 84
End: 2019-02-18

## 2019-02-18 ENCOUNTER — PATIENT MESSAGE (OUTPATIENT)
Dept: ORTHOPEDICS | Facility: CLINIC | Age: 84
End: 2019-02-18

## 2019-02-18 ENCOUNTER — ANTI-COAG VISIT (OUTPATIENT)
Dept: CARDIOLOGY | Facility: CLINIC | Age: 84
End: 2019-02-18

## 2019-02-18 DIAGNOSIS — M17.11 PRIMARY OSTEOARTHRITIS OF RIGHT KNEE: ICD-10-CM

## 2019-02-18 LAB — INR PPP: 2.1

## 2019-02-18 NOTE — PROGRESS NOTES
84 year old male, 2/15/19 -2/17/19 admitted for right knee replacement.    I spoke to the patient and he confirmed his Coumadin tablet at 2.5 mg strength.  He was instructed to take 1 tab daily after 5 pm.  Calendar updated with doses given in hospital and he started taking it at home on 2/17/19.  I emailed him a diet and when to call sheet.      Avelino with [facility] gave a verbal 2.1 INR done today with intake home visit.  I will route this to PharmD for dosing.    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  I instructed patient to take 1/2 tab on 2/18/19 and 2/19/19 then resume which he verbalized understanding. YOSEPH Palma Pharm D assisted with dosing

## 2019-02-18 NOTE — TELEPHONE ENCOUNTER
----- Message from Gene King sent at 2/18/2019  1:39 PM CST -----  Contact: [phone] [facility] nurse was calling to speak with a nurse and the patient next orders for his medication.            No answer   Rang & rang then said your call can not be answered @ this time

## 2019-02-20 NOTE — PROGRESS NOTES
Yvonne goncalves/ [facility] ([phone]) called to report that patient visit was moved from 2/20/19 to 2/21/19 .

## 2019-02-21 ENCOUNTER — ANTI-COAG VISIT (OUTPATIENT)
Dept: CARDIOLOGY | Facility: CLINIC | Age: 84
End: 2019-02-21

## 2019-02-21 DIAGNOSIS — M17.11 PRIMARY OSTEOARTHRITIS OF RIGHT KNEE: ICD-10-CM

## 2019-02-21 LAB — INR PPP: 1.6

## 2019-02-25 ENCOUNTER — ANTI-COAG VISIT (OUTPATIENT)
Dept: CARDIOLOGY | Facility: CLINIC | Age: 84
End: 2019-02-25

## 2019-02-25 DIAGNOSIS — M17.11 PRIMARY OSTEOARTHRITIS OF RIGHT KNEE: ICD-10-CM

## 2019-02-25 LAB — INR PPP: 1.5

## 2019-02-27 ENCOUNTER — TELEPHONE (OUTPATIENT)
Dept: ADMINISTRATIVE | Facility: CLINIC | Age: 84
End: 2019-02-27

## 2019-02-27 NOTE — TELEPHONE ENCOUNTER
Home Health SOC 02/18/2019 - 04/18/2019 with [facility] ([facility]) - Dr. [facility] Jr. Patient received SN and PT services.

## 2019-02-28 ENCOUNTER — LAB VISIT (OUTPATIENT)
Dept: LAB | Facility: HOSPITAL | Age: 84
End: 2019-02-28
Attending: ORTHOPAEDIC SURGERY
Payer: MEDICARE

## 2019-02-28 ENCOUNTER — ANTI-COAG VISIT (OUTPATIENT)
Dept: CARDIOLOGY | Facility: CLINIC | Age: 84
End: 2019-02-28

## 2019-02-28 ENCOUNTER — OFFICE VISIT (OUTPATIENT)
Dept: ORTHOPEDICS | Facility: CLINIC | Age: 84
End: 2019-02-28
Payer: MEDICARE

## 2019-02-28 ENCOUNTER — TELEPHONE (OUTPATIENT)
Dept: ORTHOPEDICS | Facility: CLINIC | Age: 84
End: 2019-02-28

## 2019-02-28 VITALS — TEMPERATURE: 97 F | SYSTOLIC BLOOD PRESSURE: 120 MMHG | HEART RATE: 90 BPM | DIASTOLIC BLOOD PRESSURE: 70 MMHG

## 2019-02-28 DIAGNOSIS — M17.11 PRIMARY OSTEOARTHRITIS OF RIGHT KNEE: ICD-10-CM

## 2019-02-28 DIAGNOSIS — Z96.651 STATUS POST RIGHT KNEE REPLACEMENT: Primary | ICD-10-CM

## 2019-02-28 LAB
INR PPP: 1.6
PROTHROMBIN TIME: 15.5 SEC

## 2019-02-28 PROCEDURE — 99024 PR POST-OP FOLLOW-UP VISIT: ICD-10-PCS | Mod: POP,,, | Performed by: NURSE PRACTITIONER

## 2019-02-28 PROCEDURE — 99214 OFFICE O/P EST MOD 30 MIN: CPT | Mod: PBBFAC | Performed by: NURSE PRACTITIONER

## 2019-02-28 PROCEDURE — 36415 COLL VENOUS BLD VENIPUNCTURE: CPT

## 2019-02-28 PROCEDURE — 99999 PR PBB SHADOW E&M-EST. PATIENT-LVL IV: CPT | Mod: PBBFAC,,, | Performed by: NURSE PRACTITIONER

## 2019-02-28 PROCEDURE — 99024 POSTOP FOLLOW-UP VISIT: CPT | Mod: POP,,, | Performed by: NURSE PRACTITIONER

## 2019-02-28 PROCEDURE — 99999 PR PBB SHADOW E&M-EST. PATIENT-LVL IV: ICD-10-PCS | Mod: PBBFAC,,, | Performed by: NURSE PRACTITIONER

## 2019-02-28 PROCEDURE — 85610 PROTHROMBIN TIME: CPT

## 2019-02-28 RX ORDER — OXYCODONE AND ACETAMINOPHEN 10; 325 MG/1; MG/1
1 TABLET ORAL EVERY 4 HOURS PRN
Qty: 40 TABLET | Refills: 0 | Status: SHIPPED | OUTPATIENT
Start: 2019-02-28 | End: 2019-03-28

## 2019-02-28 NOTE — TELEPHONE ENCOUNTER
Spoke with  and informed her per Shaista that Mr. Roblero is supposed to stop the coumadin and restart the Plavix. She then stated that Shaista did mention this in the appt visit but the Coumadin clinic called 3 times and stated that  needs to take both. I stated once more that he should stop the coumadin and restart the Plavix and Shaista will notify the coumadin clinic of this.

## 2019-02-28 NOTE — TELEPHONE ENCOUNTER
----- Message from Sujatha Valenzuela sent at 2/28/2019  4:26 PM CST -----  Contact: Wife  Wife is needing a call back regarding pt coumadin @[phone]

## 2019-02-28 NOTE — PROGRESS NOTES
Mayur Roblero presents for initial post-operative visit following a right total knee arthroplasty performed by Dr. [facility] on 2/15/2019. Tolerating pain medication well.      Exam:   Blood pressure 120/70, pulse 90, temperature 97.1 °F (36.2 °C).   Ambulating well with assistive device.  Incision is clean and dry without drainage or erythema. ROM:-5-90    Initial post-operative radiographs reviewed today revealing a well fixed and aligned prosthesis.    A/P:  2 weeks s/p right total knee replacement  Dr. [facility] interviewed and examined patient today and agrees with plan.   - The patient was advised to keep the incision clean and dry for the next 24 hours after which he may wash the area with antibacterial soap in the shower. Will not submerge until the incision is completely healed.   - Outpatient PT: orders faxed to [facility] as requested by patient's wife  - D/c coumadin and resume plavix per Dr. Valencia  - Pain medication refill not needed  - Follow up in 4 weeks with Dr. [facility]. Pt will call clinic with problems/concerns.

## 2019-03-01 ENCOUNTER — ANTI-COAG VISIT (OUTPATIENT)
Dept: CARDIOLOGY | Facility: CLINIC | Age: 84
End: 2019-03-01

## 2019-03-01 ENCOUNTER — TELEPHONE (OUTPATIENT)
Dept: ORTHOPEDICS | Facility: CLINIC | Age: 84
End: 2019-03-01

## 2019-03-01 ENCOUNTER — PATIENT MESSAGE (OUTPATIENT)
Dept: PULMONOLOGY | Facility: CLINIC | Age: 84
End: 2019-03-01

## 2019-03-01 DIAGNOSIS — M17.11 PRIMARY OSTEOARTHRITIS OF RIGHT KNEE: ICD-10-CM

## 2019-03-01 NOTE — TELEPHONE ENCOUNTER
Spoke with  and informed him and his wife that the orders were faxed over yesterday and I also faxed them over this morning as well.

## 2019-03-01 NOTE — TELEPHONE ENCOUNTER
----- Message from Enrique Anna MA sent at 3/1/2019 10:53 AM CST -----  Contact: Pt  Pt is requesting orders for physical therapy       Pt can be reached at [phone] and fax number to therapy is [phone].      Thanks

## 2019-03-04 DIAGNOSIS — Z96.659 TOTAL KNEE REPLACEMENT STATUS, UNSPECIFIED LATERALITY: Primary | ICD-10-CM

## 2019-03-11 ENCOUNTER — PATIENT MESSAGE (OUTPATIENT)
Dept: PULMONOLOGY | Facility: CLINIC | Age: 84
End: 2019-03-11

## 2019-03-11 RX ORDER — FLUTICASONE PROPIONATE 250 UG/1
1 POWDER, METERED RESPIRATORY (INHALATION) 2 TIMES DAILY
Qty: 1 EACH | Refills: 11 | Status: SHIPPED | OUTPATIENT
Start: 2019-03-11 | End: 2019-07-26 | Stop reason: CLARIF

## 2019-03-28 ENCOUNTER — OFFICE VISIT (OUTPATIENT)
Dept: ORTHOPEDICS | Facility: CLINIC | Age: 84
End: 2019-03-28
Payer: MEDICARE

## 2019-03-28 VITALS — WEIGHT: 196 LBS | BODY MASS INDEX: 28.06 KG/M2 | HEIGHT: 70 IN

## 2019-03-28 DIAGNOSIS — Z96.651 STATUS POST TOTAL RIGHT KNEE REPLACEMENT: Primary | ICD-10-CM

## 2019-03-28 PROCEDURE — 99212 OFFICE O/P EST SF 10 MIN: CPT | Mod: PBBFAC | Performed by: ORTHOPAEDIC SURGERY

## 2019-03-28 PROCEDURE — 99024 PR POST-OP FOLLOW-UP VISIT: ICD-10-PCS | Mod: POP,,, | Performed by: ORTHOPAEDIC SURGERY

## 2019-03-28 PROCEDURE — 99999 PR PBB SHADOW E&M-EST. PATIENT-LVL II: CPT | Mod: PBBFAC,,, | Performed by: ORTHOPAEDIC SURGERY

## 2019-03-28 PROCEDURE — 99999 PR PBB SHADOW E&M-EST. PATIENT-LVL II: ICD-10-PCS | Mod: PBBFAC,,, | Performed by: ORTHOPAEDIC SURGERY

## 2019-03-28 PROCEDURE — 99024 POSTOP FOLLOW-UP VISIT: CPT | Mod: POP,,, | Performed by: ORTHOPAEDIC SURGERY

## 2019-03-28 RX ORDER — LISINOPRIL 5 MG/1
TABLET ORAL
COMMUNITY
Start: 2019-03-23 | End: 2019-05-18

## 2019-05-09 ENCOUNTER — OFFICE VISIT (OUTPATIENT)
Dept: ORTHOPEDICS | Facility: CLINIC | Age: 84
End: 2019-05-09
Payer: MEDICARE

## 2019-05-09 VITALS — HEIGHT: 70 IN | BODY MASS INDEX: 25.96 KG/M2 | WEIGHT: 181.31 LBS

## 2019-05-09 DIAGNOSIS — Z96.651 STATUS POST TOTAL RIGHT KNEE REPLACEMENT: Primary | ICD-10-CM

## 2019-05-09 PROCEDURE — 99024 PR POST-OP FOLLOW-UP VISIT: ICD-10-PCS | Mod: POP,,, | Performed by: ORTHOPAEDIC SURGERY

## 2019-05-09 PROCEDURE — 99213 OFFICE O/P EST LOW 20 MIN: CPT | Mod: PBBFAC | Performed by: ORTHOPAEDIC SURGERY

## 2019-05-09 PROCEDURE — 99999 PR PBB SHADOW E&M-EST. PATIENT-LVL III: CPT | Mod: PBBFAC,,, | Performed by: ORTHOPAEDIC SURGERY

## 2019-05-09 PROCEDURE — 99024 POSTOP FOLLOW-UP VISIT: CPT | Mod: POP,,, | Performed by: ORTHOPAEDIC SURGERY

## 2019-05-09 PROCEDURE — 99999 PR PBB SHADOW E&M-EST. PATIENT-LVL III: ICD-10-PCS | Mod: PBBFAC,,, | Performed by: ORTHOPAEDIC SURGERY

## 2019-05-09 NOTE — PROGRESS NOTES
Patient is here today for 12 week PO FU of his TKA on 2/15/19. He is progressing well.      We will allow him to return as needed for repeat radiographs and certainly for any other questions.    xrays are  reviewed today with good alignment.    We will check repeat radiographs in 1 year.

## 2019-05-18 PROBLEM — I21.4 NSTEMI (NON-ST ELEVATED MYOCARDIAL INFARCTION): Status: ACTIVE | Noted: 2019-05-18

## 2019-05-19 PROBLEM — J18.9 PNEUMONIA OF RIGHT LUNG DUE TO INFECTIOUS ORGANISM: Status: ACTIVE | Noted: 2019-05-19

## 2019-07-19 DIAGNOSIS — G25.69 TICS OF ORGANIC ORIGIN: ICD-10-CM

## 2019-07-19 RX ORDER — ARIPIPRAZOLE 2 MG/1
TABLET ORAL
Qty: 180 TABLET | Refills: 2 | Status: SHIPPED | OUTPATIENT
Start: 2019-07-19 | End: 2020-01-01

## 2020-01-01 ENCOUNTER — HOSPITAL ENCOUNTER (OUTPATIENT)
Dept: RADIOLOGY | Facility: HOSPITAL | Age: 85
Discharge: HOME | End: 2020-02-11
Attending: ORTHOPAEDIC SURGERY
Payer: MEDICARE

## 2020-01-01 ENCOUNTER — OFFICE VISIT (OUTPATIENT)
Dept: ORTHOPEDICS | Facility: CLINIC | Age: 85
End: 2020-01-01
Payer: MEDICARE

## 2020-01-01 VITALS — WEIGHT: 192.25 LBS | HEIGHT: 70 IN | BODY MASS INDEX: 27.52 KG/M2

## 2020-01-01 DIAGNOSIS — Z96.659 TOTAL KNEE REPLACEMENT STATUS, UNSPECIFIED LATERALITY: ICD-10-CM

## 2020-01-01 DIAGNOSIS — Z96.659 TOTAL KNEE REPLACEMENT STATUS, UNSPECIFIED LATERALITY: Primary | ICD-10-CM

## 2020-01-01 PROCEDURE — 99214 OFFICE O/P EST MOD 30 MIN: CPT | Mod: S$PBB,,, | Performed by: ORTHOPAEDIC SURGERY

## 2020-01-01 PROCEDURE — 73560 X-RAY EXAM OF KNEE 1 OR 2: CPT | Mod: 26,LT,, | Performed by: RADIOLOGY

## 2020-01-01 PROCEDURE — 73562 XR KNEE ORTHO RIGHT: ICD-10-PCS | Mod: 26,RT,, | Performed by: RADIOLOGY

## 2020-01-01 PROCEDURE — 99214 PR OFFICE/OUTPT VISIT, EST, LEVL IV, 30-39 MIN: ICD-10-PCS | Mod: S$PBB,,, | Performed by: ORTHOPAEDIC SURGERY

## 2020-01-01 PROCEDURE — 73560 XR KNEE ORTHO RIGHT: ICD-10-PCS | Mod: 26,LT,, | Performed by: RADIOLOGY

## 2020-01-01 PROCEDURE — 99999 PR PBB SHADOW E&M-EST. PATIENT-LVL III: CPT | Mod: PBBFAC,,, | Performed by: ORTHOPAEDIC SURGERY

## 2020-01-01 PROCEDURE — 73562 X-RAY EXAM OF KNEE 3: CPT | Mod: 26,RT,, | Performed by: RADIOLOGY

## 2020-01-01 PROCEDURE — 73560 X-RAY EXAM OF KNEE 1 OR 2: CPT | Mod: TC,LT

## 2020-01-01 PROCEDURE — 99213 OFFICE O/P EST LOW 20 MIN: CPT | Mod: PBBFAC,25 | Performed by: ORTHOPAEDIC SURGERY

## 2020-01-01 PROCEDURE — 99999 PR PBB SHADOW E&M-EST. PATIENT-LVL III: ICD-10-PCS | Mod: PBBFAC,,, | Performed by: ORTHOPAEDIC SURGERY

## 2020-01-08 NOTE — PROGRESS NOTES
Subjective:       Patient ID: Mayur Roblero is a 83 y.o. male.    Chief Complaint: COPD    HPI Mr. Roblero is an 83-year-old former smoker who comes for an interval assessment   of chronic obstructive lung disease.  He feels that he has had stable   respiratory symptoms since his visit here in March 2018.  He continues to use   Pulmicort Flexhaler twice daily as planned.  He has a Xopenex inhaler, but has   not made recent use of this.  He has not had any recent symptoms to suggest   respiratory infection.  He has not been aware of any episodes of wheezing.  He   is not having any nighttime respiratory problems.    Mr. Roblero is followed at home for management of chronic renal insufficiency.    He recently discontinued Norvasc and began lisinopril.  Since that change in   medications, he has noticed more trouble with cough.  His wife is suspicious   that this may be an adverse effect from the lisinopril.    Mr. Roblero has the history of a previous bladder cancer.  He has not had any new   urinary symptoms to associate with this diagnosis.    DATA:  I have reviewed the images from a chest x-ray done earlier today.  The   cardiac silhouette is not enlarged.  There do not appear to be any acute   cardiovascular abnormalities.  There is very mild residual scarring in the right   upper lobe.  Today's x-ray appears unchanged in comparison to previous films   from August of last year and March 2016.      CB/IN  dd: 08/09/2018 20:12:35 (CDT)  td: 08/09/2018 21:40:08 (CDT)  Doc ID   #[id number]  Job ID #[id number]    CC:       Review of Systems   Constitutional: Positive for fatigue. Negative for fever.   HENT: Negative for postnasal drip, sinus pressure, voice change and congestion.    Respiratory: Positive for cough and dyspnea on extertion. Negative for sputum production, shortness of breath and wheezing.    Cardiovascular: Negative for chest pain and leg swelling.   Genitourinary: Negative for difficulty urinating.    Musculoskeletal: Negative for arthralgias and back pain.   Skin: Negative for rash.   Gastrointestinal: Negative for abdominal pain and acid reflux.   Neurological: Negative for dizziness and weakness.   Hematological: Negative for adenopathy.       Objective:      Physical Exam   Constitutional: He is oriented to person, place, and time. He appears well-developed and well-nourished.   HENT:   Head: Normocephalic.   Mouth/Throat: Oropharynx is clear and moist. No oropharyngeal exudate.   Neck: Normal range of motion. Neck supple. No JVD present. No tracheal deviation present. No thyromegaly present.   Cardiovascular: Normal rate, regular rhythm and normal heart sounds.    No murmur heard.  Pulmonary/Chest: Symmetric chest wall expansion. No stridor. He has decreased breath sounds. He has no wheezes. He has no rhonchi. He has no rales. He exhibits no tenderness.   Abdominal: Soft.   Musculoskeletal: He exhibits no edema.   Lymphadenopathy:     He has no cervical adenopathy.   Neurological: He is alert and oriented to person, place, and time.   Skin: Skin is warm and dry. No rash noted. No erythema. Nails show no clubbing.   Psychiatric: He has a normal mood and affect.   Vitals reviewed.    Personal Diagnostic Review    No flowsheet data found.      Assessment:       1. Centrilobular emphysema    2. Coronary artery disease involving coronary bypass graft of native heart without angina pectoris    3. Postinflammatory pulmonary fibrosis        Outpatient Encounter Prescriptions as of 8/9/2018   Medication Sig Dispense Refill    ALPHAGAN P 0.1 % Drop   0    aripiprazole (ABILIFY) 2 MG Tab Take 2 tablets (4 mg total) by mouth once daily. 180 tablet 3    aspirin (ECOTRIN) 81 MG EC tablet Take 81 mg by mouth once daily.        budesonide 180mcg (PULMICORT FLEXHALER) 180 mcg/actuation AePB Inhale 2 puffs into the lungs 2 (two) times daily. 1 each 5    clopidogrel (PLAVIX) 75 mg tablet Take 75 mg by mouth once daily.       CRESTOR 10 mg tablet TAKE 1 TABLET BY MOUTH EVERY DAY 30 tablet 6    CRESTOR 10 mg tablet TAKE 1 TABLET BY MOUTH EVERY DAY 30 tablet 6    lisinopril (PRINIVIL,ZESTRIL) 5 MG tablet Take 5 mg by mouth once daily.  4    meclizine (ANTIVERT) 25 mg tablet Take 25 mg by mouth 3 (three) times daily as needed.      OMEGA-3S/DHA/EPA/FISH OIL (OMEGA 3 ORAL) Take by mouth once daily.      prednisoLONE acetate (PRED FORTE) 1 % DrpS   0    predniSONE (DELTASONE) 10 MG tablet Take 3 tabs per day x 5 days, then 2 tabs per day x 5 days, then 1 tab per day x 5 days, then stop. Take with food 30 tablet 1    PULMICORT FLEXHALER 180 mcg/actuation AePB USE 2 PUFFS 2 TIMES DAILY AS DIRECTED 1 each 10    rosuvastatin (CRESTOR) 10 MG tablet Take 1 tablet (10 mg total) by mouth once daily. 30 tablet 11    valacyclovir (VALTREX) 500 MG tablet Take 500 mg by mouth once daily.      vit C,E,Zn,Cu glu-om3-lut-clau (OCUVITE ADULT 50+) 250-5-1 mg Cap Take by mouth once daily.      [DISCONTINUED] amLODIPine (NORVASC) 2.5 MG tablet Take 5 mg by mouth.      [DISCONTINUED] amlodipine (NORVASC) 5 MG tablet TAKE 1 TABLET BY MOUTH EVERY DAY 30 tablet 6    [DISCONTINUED] doxycycline (VIBRA-TABS) 100 MG tablet Take 1 tablet (100 mg total) by mouth every 12 (twelve) hours. 20 tablet 1    duloxetine (CYMBALTA) 30 MG capsule Take 1 capsule (30 mg total) by mouth once daily. 30 capsule 11    levalbuterol (XOPENEX HFA) 45 mcg/actuation inhaler Inhale 2 puffs into the lungs every 6 (six) hours as needed for Wheezing or Shortness of Breath. 1 Inhaler 4    [DISCONTINUED] levalbuterol (XOPENEX HFA) 45 mcg/actuation inhaler Inhale 2 puffs into the lungs every 6 (six) hours as needed for Wheezing or Shortness of Breath. 1 Inhaler 4     No facility-administered encounter medications on file as of 8/9/2018.      Orders Placed This Encounter   Procedures    X-Ray Chest PA And Lateral     Standing Status:   Future     Standing Expiration Date:    8/9/2019     Plan:     CXR today (phone report).  Refills for Xopenex ordered.  Continue Pulmicort Flex 180 2 puffs twice daily.  Return visit 6 months with Spirometry.        BACK PAIN

## 2020-02-11 NOTE — PROGRESS NOTES
HPI:    Mayur Roblero is a 85 y.o. male who is here today for   Chief Complaint   Patient presents with    Right Knee - Follow-up   .  One year follow-up on right total knee done 02/15/2019.  Patient doing well.    Duration: 12 months  Intensity: mild  Character of pain: achy  Location: He reports that the pain is predominately  intermediate    Past Medical History:   Diagnosis Date    Bladder cancer     Coronary artery disease     Emphysema of lung     Hyperlipidemia     Pneumonia     Pulmonary nodule           Current Outpatient Medications:     ARIPiprazole (ABILIFY) 2 MG Tab, TAKE 2 TABLETS EVERY DAY (Patient taking differently: TAKE 1 TABLETS EVERY DAY), Disp: 180 tablet, Rfl: 2    aspirin (ECOTRIN) 81 MG EC tablet, Take 81 mg by mouth once daily., Disp: , Rfl:     budesonide (PULMICORT) 0.5 mg/2 mL nebulizer solution, Take 2 mLs (0.5 mg total) by nebulization once daily. Controller, Disp: 120 mL, Rfl: 11    clopidogrel (PLAVIX) 75 mg tablet, Take 1 tablet (75 mg total) by mouth once daily., Disp: 30 tablet, Rfl: 11    CRESTOR 10 mg tablet, TAKE 1 TABLET BY MOUTH EVERY DAY, Disp: 30 tablet, Rfl: 6    OMEGA-3S/DHA/EPA/FISH OIL (OMEGA 3 ORAL), Take 1,000 mg by mouth once daily. , Disp: , Rfl:     vit A/vit C/vit E/zinc/copper (PRESERVISION AREDS ORAL), Take 1 tablet by mouth once daily. , Disp: , Rfl:      Review of patient's allergies indicates:   Allergen Reactions    Dilaudid [hydromorphone] Nausea And Vomiting    Keflex [cephalexin] Hives    Morphine Anaphylaxis     Patient's wife states it caused an ileus.  Hospitalized for 9 days    Moxifloxacin Other (See Comments)     Fever  Fever    Tiotropium bromide Anaphylaxis    Ondansetron Other (See Comments)     Vomiting blood    Codeine Other (See Comments)     unknown        ROS  Constitutional: Negative for fever, Negative for weight loss  HENT Negative for congestion  Cardiovascular: Negative chest pain  Respiratory: Negative Shortness of  "breath  Heme: Negative excessive bleeding  Skin:NegativeItching, Negative breakdown  Musculoskeletal:Negative for back pain, Positive for joint pain, Negative muscle pain, Negative muscle weakness  Neurological: Negative for numbness and paresthesias   Psychiatric/Behavioral: Negative altered mental status, Negative for depression    Physical Exam:   Ht 5' 10" (1.778 m)   Wt 87.2 kg (192 lb 3.9 oz)   BMI 27.58 kg/m²   General appearance: This is a well-developed, Well nourished male No obvious acute distress.  Psychiatric: normal mood and affect;  pleasant and conversant; judgment and thought content normal  Gait is coordinated.  Cardiovascular: There are no swelling or varicosities present.   Respiratory: normal respiratory effort   Lymphatic: no adenopathy   Neurologic: alert and oriented to person, place, and time   Deep Tendon Reflexes are normal;  Coordination and Balance: Normal   Musculoskeletal   Neck    ROM shows normal flexion and extension and lateral rotation    Palpation: Non-tender    Stability is normal    Strength is normal    Skin is normal without masses and lesions    Sensation is intact to light touch   Back    ROM showsabnormal flexion, extension    and  rotation    Palpation shows no masses    Stability is normal    Strength to flexion and extension well maintained    Core strength is diminished    Skin shows no rashes or cafe au lait spots;     Sensation is intact to light touch    Right Knee  Swelling Mild  TendernessNone  Range of Motion: 120    Left Knee: Swelling None  TendernessNone  Range of Motion: 120    Radiograph   Right total knee in excellent position no signs of loosening or failure.    Assessment:  Stable right total knee.    Plan:  Patient may resume activities as tolerated.  "

## 2020-05-12 PROBLEM — R93.89 ABNORMAL CT OF THE CHEST: Status: ACTIVE | Noted: 2020-01-01

## 2020-07-23 PROBLEM — I25.10 CORONARY ATHEROSCLEROSIS OF NATIVE CORONARY ARTERY: Status: ACTIVE | Noted: 2020-01-01

## 2020-08-13 PROBLEM — I89.8 CALCIFIED LYMPH NODES: Status: ACTIVE | Noted: 2020-01-01

## 2020-10-09 PROBLEM — R79.89 ELEVATED TROPONIN: Status: ACTIVE | Noted: 2020-01-01

## 2020-10-09 PROBLEM — U07.1 COVID-19 VIRUS DETECTED: Status: ACTIVE | Noted: 2020-01-01

## 2020-10-09 PROBLEM — Z71.89 ACP (ADVANCE CARE PLANNING): Status: ACTIVE | Noted: 2020-01-01

## 2020-10-10 PROBLEM — U07.1 ACUTE HYPOXEMIC RESPIRATORY FAILURE DUE TO COVID-19: Status: ACTIVE | Noted: 2020-01-01

## 2020-10-10 PROBLEM — J96.01 ACUTE HYPOXEMIC RESPIRATORY FAILURE DUE TO COVID-19: Status: ACTIVE | Noted: 2020-01-01

## 2020-10-11 PROBLEM — R00.0 SINUS TACHYCARDIA: Status: ACTIVE | Noted: 2020-01-01

## 2020-10-12 PROBLEM — J12.82 PNEUMONIA DUE TO COVID-19 VIRUS: Status: ACTIVE | Noted: 2020-01-01

## 2020-10-17 PROBLEM — U07.1 COVID-19: Status: ACTIVE | Noted: 2020-01-01

## 2020-10-17 PROBLEM — U07.1 COVID-19 VIRUS INFECTION: Status: ACTIVE | Noted: 2020-01-01

## 2020-10-17 PROBLEM — E86.0 DEHYDRATION: Status: ACTIVE | Noted: 2020-01-01

## 2020-10-17 PROBLEM — R06.02 SOB (SHORTNESS OF BREATH): Status: ACTIVE | Noted: 2020-01-01

## 2020-10-18 PROBLEM — N17.9 AKI (ACUTE KIDNEY INJURY): Status: ACTIVE | Noted: 2020-01-01

## 2020-10-20 PROBLEM — E87.5 HYPERKALEMIA: Status: ACTIVE | Noted: 2020-01-01

## 2020-10-22 PROBLEM — Z51.5 COMFORT MEASURES ONLY STATUS: Status: ACTIVE | Noted: 2020-01-01

## (undated) DEVICE — SOL IRR NACL .9% 3000ML

## (undated) DEVICE — KIT TOTAL KNEE TKOFG

## (undated) DEVICE — DRESSING TRANS 4X4 TEGADERM

## (undated) DEVICE — SEE MEDLINE ITEM 154981

## (undated) DEVICE — DRESSING AQUACEL AG ADV 3.5X12

## (undated) DEVICE — BLADE SURG CARBON STEEL #10

## (undated) DEVICE — TAPE SURG DURAPORE 2 X10YD

## (undated) DEVICE — SUT MONOCRYL 3-0 SH U/D

## (undated) DEVICE — BANDAGE ESMARK 6X12

## (undated) DEVICE — BLADE SAG 18.0X1.27X100

## (undated) DEVICE — BIT DRILL PIN TROCAR 3.2MM
Type: IMPLANTABLE DEVICE | Site: KNEE | Status: NON-FUNCTIONAL
Removed: 2019-02-15

## (undated) DEVICE — SEE MEDLINE ITEM 152487

## (undated) DEVICE — KIT IRR SUCTION HND PIECE

## (undated) DEVICE — SUT CTD VICRYL 0 UND BR CPX

## (undated) DEVICE — BANDAGE ACE ELASTIC 6"

## (undated) DEVICE — TOURNIQUET SB QC DP 34X4IN

## (undated) DEVICE — PADDING CAST SPECIALIST 6X4YD

## (undated) DEVICE — SEE MEDLINE ITEM 152529

## (undated) DEVICE — ELECTRODE REM PLYHSV RETURN 9

## (undated) DEVICE — HOOD T-5 TEAR AWAY STERILE

## (undated) DEVICE — SET CEMENT (SCULP)

## (undated) DEVICE — SUT VICRYL BR 1 GEN 27 CT-1

## (undated) DEVICE — ADHESIVE DERMABOND ADVANCED

## (undated) DEVICE — DRAPE STERI INSTRUMENT 1018

## (undated) DEVICE — SEE MEDLINE ITEM 152515

## (undated) DEVICE — SEE MEDLINE ITEM 157131

## (undated) DEVICE — SYR 30CC LUER LOCK

## (undated) DEVICE — MASK FLYTE HOOD PEEL AWAY

## (undated) DEVICE — PUMP COLD THERAPY

## (undated) DEVICE — BOWL CEMENT

## (undated) DEVICE — SCREW HEX HEAD
Type: IMPLANTABLE DEVICE | Site: KNEE | Status: NON-FUNCTIONAL
Removed: 2019-02-15

## (undated) DEVICE — BLADE RECIP RIBBED

## (undated) DEVICE — SEE MEDLINE ITEM 146298

## (undated) DEVICE — SUT VICRYL 3-0 27 SH

## (undated) DEVICE — BLADE SAG 13.0 X1.27X90

## (undated) DEVICE — BRUSH SURGICAL SCRUB STERILE

## (undated) DEVICE — PAD COLD THERAPY KNEE WRAP ON

## (undated) DEVICE — SCREW HEX HEAD 3.5X38MM
Type: IMPLANTABLE DEVICE | Site: KNEE | Status: NON-FUNCTIONAL
Removed: 2019-02-15

## (undated) DEVICE — SYR ONLY LUER LOCK 20CC

## (undated) DEVICE — Device

## (undated) DEVICE — NDL 18GA X1 1/2 REG BEVEL

## (undated) DEVICE — SEE MEDLINE ITEM 157117